# Patient Record
Sex: FEMALE | Race: AMERICAN INDIAN OR ALASKA NATIVE
[De-identification: names, ages, dates, MRNs, and addresses within clinical notes are randomized per-mention and may not be internally consistent; named-entity substitution may affect disease eponyms.]

---

## 2020-09-17 ENCOUNTER — HOSPITAL ENCOUNTER (OUTPATIENT)
Dept: HOSPITAL 5 - TRG | Age: 20
Discharge: LEFT BEFORE DISCHARGE | End: 2020-09-17
Attending: OBSTETRICS & GYNECOLOGY
Payer: COMMERCIAL

## 2020-09-17 VITALS — SYSTOLIC BLOOD PRESSURE: 118 MMHG | DIASTOLIC BLOOD PRESSURE: 90 MMHG

## 2020-09-17 DIAGNOSIS — O36.8130: Primary | ICD-10-CM

## 2020-09-17 DIAGNOSIS — Z3A.39: ICD-10-CM

## 2020-09-17 PROCEDURE — 59025 FETAL NON-STRESS TEST: CPT

## 2022-02-15 ENCOUNTER — HOSPITAL ENCOUNTER (INPATIENT)
Age: 22
LOS: 5 days | Discharge: PSYCHIATRIC OR SUBSTANCE USE FACILITY | DRG: 817 | End: 2022-02-20
Attending: EMERGENCY MEDICINE | Admitting: INTERNAL MEDICINE
Payer: MEDICAID

## 2022-02-15 ENCOUNTER — APPOINTMENT (OUTPATIENT)
Dept: CT IMAGING | Age: 22
DRG: 817 | End: 2022-02-15
Payer: MEDICAID

## 2022-02-15 ENCOUNTER — APPOINTMENT (OUTPATIENT)
Dept: GENERAL RADIOLOGY | Age: 22
DRG: 817 | End: 2022-02-15
Payer: MEDICAID

## 2022-02-15 DIAGNOSIS — I46.9 CARDIAC ARREST (HCC): Primary | ICD-10-CM

## 2022-02-15 LAB
-: ABNORMAL
ABSOLUTE EOS #: 0.03 K/UL (ref 0–0.44)
ABSOLUTE EOS #: 0.05 K/UL (ref 0–0.44)
ABSOLUTE IMMATURE GRANULOCYTE: 0.09 K/UL (ref 0–0.3)
ABSOLUTE IMMATURE GRANULOCYTE: 0.39 K/UL (ref 0–0.3)
ABSOLUTE LYMPH #: 1.08 K/UL (ref 1.1–3.7)
ABSOLUTE LYMPH #: 1.74 K/UL (ref 1.1–3.7)
ABSOLUTE MONO #: 0.89 K/UL (ref 0.1–1.2)
ABSOLUTE MONO #: 1.11 K/UL (ref 0.1–1.2)
ACETAMINOPHEN LEVEL: <5 UG/ML (ref 10–30)
ALBUMIN SERPL-MCNC: 4 G/DL (ref 3.5–5.2)
ALBUMIN/GLOBULIN RATIO: 1.5 (ref 1–2.5)
ALLEN TEST: ABNORMAL
ALP BLD-CCNC: 78 U/L (ref 35–104)
ALT SERPL-CCNC: 300 U/L (ref 5–33)
AMORPHOUS: ABNORMAL
AMPHETAMINE SCREEN URINE: NEGATIVE
ANION GAP SERPL CALCULATED.3IONS-SCNC: 11 MMOL/L (ref 9–17)
ANION GAP SERPL CALCULATED.3IONS-SCNC: 14 MMOL/L (ref 9–17)
ANION GAP SERPL CALCULATED.3IONS-SCNC: 16 MMOL/L (ref 9–17)
AST SERPL-CCNC: 297 U/L
BACTERIA: ABNORMAL
BARBITURATE SCREEN URINE: NEGATIVE
BASOPHILS # BLD: 0 % (ref 0–2)
BASOPHILS # BLD: 0 % (ref 0–2)
BASOPHILS ABSOLUTE: 0.04 K/UL (ref 0–0.2)
BASOPHILS ABSOLUTE: 0.05 K/UL (ref 0–0.2)
BENZODIAZEPINE SCREEN, URINE: NEGATIVE
BILIRUB SERPL-MCNC: <0.1 MG/DL (ref 0.3–1.2)
BILIRUBIN URINE: NEGATIVE
BNP INTERPRETATION: NORMAL
BUN BLDV-MCNC: 13 MG/DL (ref 6–20)
BUN BLDV-MCNC: 16 MG/DL (ref 6–20)
BUN BLDV-MCNC: 18 MG/DL (ref 6–20)
BUN/CREAT BLD: ABNORMAL (ref 9–20)
BUPRENORPHINE URINE: NORMAL
CALCIUM IONIZED: 1.06 MMOL/L (ref 1.13–1.33)
CALCIUM IONIZED: 1.12 MMOL/L (ref 1.13–1.33)
CALCIUM SERPL-MCNC: 7.6 MG/DL (ref 8.6–10.4)
CALCIUM SERPL-MCNC: 8.3 MG/DL (ref 8.6–10.4)
CALCIUM SERPL-MCNC: 8.4 MG/DL (ref 8.6–10.4)
CANNABINOID SCREEN URINE: NEGATIVE
CASTS UA: ABNORMAL /LPF (ref 0–2)
CHLORIDE BLD-SCNC: 108 MMOL/L (ref 98–107)
CHLORIDE BLD-SCNC: 109 MMOL/L (ref 98–107)
CHLORIDE BLD-SCNC: 111 MMOL/L (ref 98–107)
CO2: 15 MMOL/L (ref 20–31)
CO2: 18 MMOL/L (ref 20–31)
CO2: 20 MMOL/L (ref 20–31)
COCAINE METABOLITE, URINE: NEGATIVE
COLOR: YELLOW
COMMENT UA: ABNORMAL
CREAT SERPL-MCNC: 1.06 MG/DL (ref 0.5–0.9)
CREAT SERPL-MCNC: 1.18 MG/DL (ref 0.5–0.9)
CREAT SERPL-MCNC: 1.31 MG/DL (ref 0.5–0.9)
CRYSTALS, UA: ABNORMAL /HPF
DIFFERENTIAL TYPE: ABNORMAL
DIFFERENTIAL TYPE: ABNORMAL
EOSINOPHILS RELATIVE PERCENT: 0 % (ref 1–4)
EOSINOPHILS RELATIVE PERCENT: 0 % (ref 1–4)
EPITHELIAL CELLS UA: ABNORMAL /HPF (ref 0–5)
ETHANOL PERCENT: <0.01 %
ETHANOL: <10 MG/DL
FIO2: 35
FIO2: 35
FIO2: 50
GFR AFRICAN AMERICAN: >60 ML/MIN
GFR NON-AFRICAN AMERICAN: 50 ML/MIN
GFR NON-AFRICAN AMERICAN: 57 ML/MIN
GFR NON-AFRICAN AMERICAN: >60 ML/MIN
GFR SERPL CREATININE-BSD FRML MDRD: ABNORMAL ML/MIN/{1.73_M2}
GLUCOSE BLD-MCNC: 110 MG/DL (ref 65–105)
GLUCOSE BLD-MCNC: 110 MG/DL (ref 70–99)
GLUCOSE BLD-MCNC: 137 MG/DL (ref 65–105)
GLUCOSE BLD-MCNC: 150 MG/DL (ref 65–105)
GLUCOSE BLD-MCNC: 45 MG/DL (ref 70–99)
GLUCOSE BLD-MCNC: 66 MG/DL (ref 74–100)
GLUCOSE BLD-MCNC: 72 MG/DL (ref 74–100)
GLUCOSE BLD-MCNC: 77 MG/DL (ref 70–99)
GLUCOSE BLD-MCNC: 79 MG/DL (ref 65–105)
GLUCOSE BLD-MCNC: 83 MG/DL (ref 74–100)
GLUCOSE BLD-MCNC: 87 MG/DL (ref 65–105)
GLUCOSE URINE: NEGATIVE
HCG QUALITATIVE: NEGATIVE
HCT VFR BLD CALC: 38.1 % (ref 36.3–47.1)
HCT VFR BLD CALC: 40.8 % (ref 36.3–47.1)
HEMOGLOBIN: 12.4 G/DL (ref 11.9–15.1)
HEMOGLOBIN: 12.9 G/DL (ref 11.9–15.1)
IMMATURE GRANULOCYTES: 0 %
IMMATURE GRANULOCYTES: 2 %
INR BLD: 1
INR BLD: 1.1
KETONES, URINE: ABNORMAL
LACTIC ACID, WHOLE BLOOD: 0.6 MMOL/L (ref 0.7–2.1)
LACTIC ACID, WHOLE BLOOD: 1 MMOL/L (ref 0.7–2.1)
LACTIC ACID: NORMAL MMOL/L
LEUKOCYTE ESTERASE, URINE: ABNORMAL
LYMPHOCYTES # BLD: 5 % (ref 24–43)
LYMPHOCYTES # BLD: 9 % (ref 24–43)
MAGNESIUM: 2.3 MG/DL (ref 1.6–2.6)
MAGNESIUM: 2.6 MG/DL (ref 1.6–2.6)
MCH RBC QN AUTO: 30 PG (ref 25.2–33.5)
MCH RBC QN AUTO: 30.5 PG (ref 25.2–33.5)
MCHC RBC AUTO-ENTMCNC: 31.6 G/DL (ref 28.4–34.8)
MCHC RBC AUTO-ENTMCNC: 32.5 G/DL (ref 28.4–34.8)
MCV RBC AUTO: 93.6 FL (ref 82.6–102.9)
MCV RBC AUTO: 94.9 FL (ref 82.6–102.9)
MDMA URINE: NORMAL
METHADONE SCREEN, URINE: NEGATIVE
METHAMPHETAMINE, URINE: NORMAL
MODE: ABNORMAL
MONOCYTES # BLD: 4 % (ref 3–12)
MONOCYTES # BLD: 6 % (ref 3–12)
MUCUS: ABNORMAL
NEGATIVE BASE EXCESS, ART: 3 (ref 0–2)
NEGATIVE BASE EXCESS, ART: 4 (ref 0–2)
NEGATIVE BASE EXCESS, ART: 4 (ref 0–2)
NITRITE, URINE: NEGATIVE
NRBC AUTOMATED: 0 PER 100 WBC
NRBC AUTOMATED: 0 PER 100 WBC
O2 DEVICE/FLOW/%: ABNORMAL
OPIATES, URINE: NEGATIVE
OTHER OBSERVATIONS UA: ABNORMAL
OXYCODONE SCREEN URINE: NEGATIVE
PARTIAL THROMBOPLASTIN TIME: 21.6 SEC (ref 20.5–30.5)
PARTIAL THROMBOPLASTIN TIME: 35 SEC (ref 20.5–30.5)
PATIENT TEMP: 36.9
PATIENT TEMP: 37.7
PATIENT TEMP: ABNORMAL
PDW BLD-RTO: 13.4 % (ref 11.8–14.4)
PDW BLD-RTO: 13.5 % (ref 11.8–14.4)
PH UA: 6.5 (ref 5–8)
PHENCYCLIDINE, URINE: NEGATIVE
PHOSPHORUS: 4.1 MG/DL (ref 2.6–4.5)
PHOSPHORUS: 4.9 MG/DL (ref 2.6–4.5)
PLATELET # BLD: 276 K/UL (ref 138–453)
PLATELET # BLD: 343 K/UL (ref 138–453)
PLATELET ESTIMATE: ABNORMAL
PLATELET ESTIMATE: ABNORMAL
PMV BLD AUTO: 8.9 FL (ref 8.1–13.5)
PMV BLD AUTO: 9.4 FL (ref 8.1–13.5)
POC HCO3: 21.1 MMOL/L (ref 21–28)
POC HCO3: 21.6 MMOL/L (ref 21–28)
POC HCO3: 22.2 MMOL/L (ref 21–28)
POC O2 SATURATION: 98 % (ref 94–98)
POC O2 SATURATION: 99 % (ref 94–98)
POC O2 SATURATION: 99 % (ref 94–98)
POC PCO2 TEMP: 40 MM HG
POC PCO2 TEMP: ABNORMAL MM HG
POC PCO2 TEMP: ABNORMAL MM HG
POC PCO2: 39.2 MM HG (ref 35–48)
POC PCO2: 39.6 MM HG (ref 35–48)
POC PCO2: 41 MM HG (ref 35–48)
POC PH TEMP: 7.35
POC PH TEMP: ABNORMAL
POC PH TEMP: ABNORMAL
POC PH: 7.33 (ref 7.35–7.45)
POC PH: 7.33 (ref 7.35–7.45)
POC PH: 7.36 (ref 7.35–7.45)
POC PO2 TEMP: 135 MM HG
POC PO2 TEMP: ABNORMAL MM HG
POC PO2 TEMP: ABNORMAL MM HG
POC PO2: 122.3 MM HG (ref 83–108)
POC PO2: 130 MM HG (ref 83–108)
POC PO2: 164.6 MM HG (ref 83–108)
POSITIVE BASE EXCESS, ART: ABNORMAL (ref 0–3)
POTASSIUM SERPL-SCNC: 3.6 MMOL/L (ref 3.7–5.3)
POTASSIUM SERPL-SCNC: 3.6 MMOL/L (ref 3.7–5.3)
POTASSIUM SERPL-SCNC: 4 MMOL/L (ref 3.7–5.3)
PRO-BNP: 179 PG/ML
PROPOXYPHENE, URINE: NORMAL
PROTEIN UA: ABNORMAL
PROTHROMBIN TIME: 10.8 SEC (ref 9.1–12.3)
PROTHROMBIN TIME: 11.8 SEC (ref 9.1–12.3)
RBC # BLD: 4.07 M/UL (ref 3.95–5.11)
RBC # BLD: 4.3 M/UL (ref 3.95–5.11)
RBC # BLD: ABNORMAL 10*6/UL
RBC # BLD: ABNORMAL 10*6/UL
RBC UA: ABNORMAL /HPF (ref 0–2)
RENAL EPITHELIAL, UA: ABNORMAL /HPF
SALICYLATE LEVEL: <1 MG/DL (ref 3–10)
SAMPLE SITE: ABNORMAL
SARS-COV-2, RAPID: NOT DETECTED
SEG NEUTROPHILS: 83 % (ref 36–65)
SEG NEUTROPHILS: 91 % (ref 36–65)
SEGMENTED NEUTROPHILS ABSOLUTE COUNT: 16.23 K/UL (ref 1.5–8.1)
SEGMENTED NEUTROPHILS ABSOLUTE COUNT: 18.88 K/UL (ref 1.5–8.1)
SODIUM BLD-SCNC: 137 MMOL/L (ref 135–144)
SODIUM BLD-SCNC: 142 MMOL/L (ref 135–144)
SODIUM BLD-SCNC: 143 MMOL/L (ref 135–144)
SPECIFIC GRAVITY UA: 1.02 (ref 1–1.03)
SPECIMEN DESCRIPTION: NORMAL
TCO2 (CALC), ART: ABNORMAL MMOL/L (ref 22–29)
TEST INFORMATION: NORMAL
TOTAL PROTEIN: 6.7 G/DL (ref 6.4–8.3)
TOXIC TRICYCLIC SC,BLOOD: NEGATIVE
TRICHOMONAS: ABNORMAL
TRICYCLIC ANTIDEPRESSANTS, UR: NORMAL
TROPONIN INTERP: ABNORMAL
TROPONIN INTERP: ABNORMAL
TROPONIN T: ABNORMAL NG/ML
TROPONIN T: ABNORMAL NG/ML
TROPONIN, HIGH SENSITIVITY: 116 NG/L (ref 0–14)
TROPONIN, HIGH SENSITIVITY: 98 NG/L (ref 0–14)
TURBIDITY: ABNORMAL
URINE HGB: ABNORMAL
UROBILINOGEN, URINE: NORMAL
WBC # BLD: 19.6 K/UL (ref 3.5–11.3)
WBC # BLD: 21 K/UL (ref 3.5–11.3)
WBC # BLD: ABNORMAL 10*3/UL
WBC # BLD: ABNORMAL 10*3/UL
WBC UA: ABNORMAL /HPF (ref 0–5)
YEAST: ABNORMAL

## 2022-02-15 PROCEDURE — 85730 THROMBOPLASTIN TIME PARTIAL: CPT

## 2022-02-15 PROCEDURE — 94002 VENT MGMT INPAT INIT DAY: CPT

## 2022-02-15 PROCEDURE — 99291 CRITICAL CARE FIRST HOUR: CPT | Performed by: INTERNAL MEDICINE

## 2022-02-15 PROCEDURE — 74177 CT ABD & PELVIS W/CONTRAST: CPT

## 2022-02-15 PROCEDURE — 83880 ASSAY OF NATRIURETIC PEPTIDE: CPT

## 2022-02-15 PROCEDURE — 2580000003 HC RX 258: Performed by: STUDENT IN AN ORGANIZED HEALTH CARE EDUCATION/TRAINING PROGRAM

## 2022-02-15 PROCEDURE — 6370000000 HC RX 637 (ALT 250 FOR IP): Performed by: GENERAL PRACTICE

## 2022-02-15 PROCEDURE — 5A1945Z RESPIRATORY VENTILATION, 24-96 CONSECUTIVE HOURS: ICD-10-PCS | Performed by: INTERNAL MEDICINE

## 2022-02-15 PROCEDURE — 71260 CT THORAX DX C+: CPT

## 2022-02-15 PROCEDURE — 84100 ASSAY OF PHOSPHORUS: CPT

## 2022-02-15 PROCEDURE — 80053 COMPREHEN METABOLIC PANEL: CPT

## 2022-02-15 PROCEDURE — 02H633Z INSERTION OF INFUSION DEVICE INTO RIGHT ATRIUM, PERCUTANEOUS APPROACH: ICD-10-PCS | Performed by: INTERNAL MEDICINE

## 2022-02-15 PROCEDURE — 6360000002 HC RX W HCPCS: Performed by: GENERAL PRACTICE

## 2022-02-15 PROCEDURE — 93005 ELECTROCARDIOGRAM TRACING: CPT | Performed by: STUDENT IN AN ORGANIZED HEALTH CARE EDUCATION/TRAINING PROGRAM

## 2022-02-15 PROCEDURE — 70450 CT HEAD/BRAIN W/O DYE: CPT

## 2022-02-15 PROCEDURE — 6360000002 HC RX W HCPCS

## 2022-02-15 PROCEDURE — 83735 ASSAY OF MAGNESIUM: CPT

## 2022-02-15 PROCEDURE — 36556 INSERT NON-TUNNEL CV CATH: CPT | Performed by: INTERNAL MEDICINE

## 2022-02-15 PROCEDURE — 6360000002 HC RX W HCPCS: Performed by: STUDENT IN AN ORGANIZED HEALTH CARE EDUCATION/TRAINING PROGRAM

## 2022-02-15 PROCEDURE — 82803 BLOOD GASES ANY COMBINATION: CPT

## 2022-02-15 PROCEDURE — 81001 URINALYSIS AUTO W/SCOPE: CPT

## 2022-02-15 PROCEDURE — 36620 INSERTION CATHETER ARTERY: CPT | Performed by: INTERNAL MEDICINE

## 2022-02-15 PROCEDURE — 80048 BASIC METABOLIC PNL TOTAL CA: CPT

## 2022-02-15 PROCEDURE — 6370000000 HC RX 637 (ALT 250 FOR IP): Performed by: STUDENT IN AN ORGANIZED HEALTH CARE EDUCATION/TRAINING PROGRAM

## 2022-02-15 PROCEDURE — 37799 UNLISTED PX VASCULAR SURGERY: CPT

## 2022-02-15 PROCEDURE — G0480 DRUG TEST DEF 1-7 CLASSES: HCPCS

## 2022-02-15 PROCEDURE — 36620 INSERTION CATHETER ARTERY: CPT

## 2022-02-15 PROCEDURE — 82947 ASSAY GLUCOSE BLOOD QUANT: CPT

## 2022-02-15 PROCEDURE — 80143 DRUG ASSAY ACETAMINOPHEN: CPT

## 2022-02-15 PROCEDURE — 83605 ASSAY OF LACTIC ACID: CPT

## 2022-02-15 PROCEDURE — 71045 X-RAY EXAM CHEST 1 VIEW: CPT

## 2022-02-15 PROCEDURE — 84484 ASSAY OF TROPONIN QUANT: CPT

## 2022-02-15 PROCEDURE — 2500000003 HC RX 250 WO HCPCS: Performed by: STUDENT IN AN ORGANIZED HEALTH CARE EDUCATION/TRAINING PROGRAM

## 2022-02-15 PROCEDURE — 85025 COMPLETE CBC W/AUTO DIFF WBC: CPT

## 2022-02-15 PROCEDURE — 2580000003 HC RX 258: Performed by: GENERAL PRACTICE

## 2022-02-15 PROCEDURE — 36556 INSERT NON-TUNNEL CV CATH: CPT

## 2022-02-15 PROCEDURE — 2000000000 HC ICU R&B

## 2022-02-15 PROCEDURE — 2700000000 HC OXYGEN THERAPY PER DAY

## 2022-02-15 PROCEDURE — 99284 EMERGENCY DEPT VISIT MOD MDM: CPT

## 2022-02-15 PROCEDURE — 80307 DRUG TEST PRSMV CHEM ANLYZR: CPT

## 2022-02-15 PROCEDURE — 87635 SARS-COV-2 COVID-19 AMP PRB: CPT

## 2022-02-15 PROCEDURE — 2500000003 HC RX 250 WO HCPCS: Performed by: GENERAL PRACTICE

## 2022-02-15 PROCEDURE — 80179 DRUG ASSAY SALICYLATE: CPT

## 2022-02-15 PROCEDURE — 94761 N-INVAS EAR/PLS OXIMETRY MLT: CPT

## 2022-02-15 PROCEDURE — 84703 CHORIONIC GONADOTROPIN ASSAY: CPT

## 2022-02-15 PROCEDURE — 6360000004 HC RX CONTRAST MEDICATION: Performed by: STUDENT IN AN ORGANIZED HEALTH CARE EDUCATION/TRAINING PROGRAM

## 2022-02-15 PROCEDURE — 85610 PROTHROMBIN TIME: CPT

## 2022-02-15 PROCEDURE — 94003 VENT MGMT INPAT SUBQ DAY: CPT

## 2022-02-15 PROCEDURE — 82330 ASSAY OF CALCIUM: CPT

## 2022-02-15 RX ORDER — POLYETHYLENE GLYCOL 3350 17 G/17G
17 POWDER, FOR SOLUTION ORAL DAILY PRN
Status: DISCONTINUED | OUTPATIENT
Start: 2022-02-15 | End: 2022-02-20 | Stop reason: HOSPADM

## 2022-02-15 RX ORDER — DEXTROSE MONOHYDRATE 25 G/50ML
12.5 INJECTION, SOLUTION INTRAVENOUS PRN
Status: DISCONTINUED | OUTPATIENT
Start: 2022-02-15 | End: 2022-02-20 | Stop reason: HOSPADM

## 2022-02-15 RX ORDER — MAGNESIUM SULFATE IN WATER 40 MG/ML
2000 INJECTION, SOLUTION INTRAVENOUS ONCE
Status: COMPLETED | OUTPATIENT
Start: 2022-02-15 | End: 2022-02-15

## 2022-02-15 RX ORDER — ONDANSETRON 4 MG/1
4 TABLET, ORALLY DISINTEGRATING ORAL EVERY 8 HOURS PRN
Status: DISCONTINUED | OUTPATIENT
Start: 2022-02-15 | End: 2022-02-20 | Stop reason: HOSPADM

## 2022-02-15 RX ORDER — HEPARIN SODIUM 1000 [USP'U]/ML
4000 INJECTION, SOLUTION INTRAVENOUS; SUBCUTANEOUS ONCE
Status: COMPLETED | OUTPATIENT
Start: 2022-02-15 | End: 2022-02-15

## 2022-02-15 RX ORDER — HEPARIN SODIUM 1000 [USP'U]/ML
4000 INJECTION, SOLUTION INTRAVENOUS; SUBCUTANEOUS PRN
Status: DISCONTINUED | OUTPATIENT
Start: 2022-02-15 | End: 2022-02-17

## 2022-02-15 RX ORDER — ONDANSETRON 2 MG/ML
4 INJECTION INTRAMUSCULAR; INTRAVENOUS EVERY 6 HOURS PRN
Status: DISCONTINUED | OUTPATIENT
Start: 2022-02-15 | End: 2022-02-20 | Stop reason: HOSPADM

## 2022-02-15 RX ORDER — DEXTROSE MONOHYDRATE 50 MG/ML
100 INJECTION, SOLUTION INTRAVENOUS PRN
Status: DISCONTINUED | OUTPATIENT
Start: 2022-02-15 | End: 2022-02-20 | Stop reason: HOSPADM

## 2022-02-15 RX ORDER — CALCIUM GLUCONATE 20 MG/ML
INJECTION, SOLUTION INTRAVENOUS
Status: DISCONTINUED
Start: 2022-02-15 | End: 2022-02-15 | Stop reason: WASHOUT

## 2022-02-15 RX ORDER — SODIUM CHLORIDE 0.9 % (FLUSH) 0.9 %
5-40 SYRINGE (ML) INJECTION PRN
Status: DISCONTINUED | OUTPATIENT
Start: 2022-02-15 | End: 2022-02-20 | Stop reason: HOSPADM

## 2022-02-15 RX ORDER — ONDANSETRON 4 MG/1
4 TABLET, ORALLY DISINTEGRATING ORAL EVERY 8 HOURS PRN
Status: DISCONTINUED | OUTPATIENT
Start: 2022-02-15 | End: 2022-02-15 | Stop reason: SDUPTHER

## 2022-02-15 RX ORDER — ACETAMINOPHEN 325 MG/1
650 TABLET ORAL EVERY 6 HOURS PRN
Status: DISCONTINUED | OUTPATIENT
Start: 2022-02-15 | End: 2022-02-20 | Stop reason: HOSPADM

## 2022-02-15 RX ORDER — ONDANSETRON 2 MG/ML
4 INJECTION INTRAMUSCULAR; INTRAVENOUS EVERY 6 HOURS PRN
Status: DISCONTINUED | OUTPATIENT
Start: 2022-02-15 | End: 2022-02-15 | Stop reason: SDUPTHER

## 2022-02-15 RX ORDER — NOREPINEPHRINE BIT/0.9 % NACL 16MG/250ML
INFUSION BOTTLE (ML) INTRAVENOUS
Status: DISPENSED
Start: 2022-02-15 | End: 2022-02-15

## 2022-02-15 RX ORDER — SODIUM CHLORIDE 9 MG/ML
INJECTION, SOLUTION INTRAVENOUS CONTINUOUS
Status: DISCONTINUED | OUTPATIENT
Start: 2022-02-15 | End: 2022-02-18

## 2022-02-15 RX ORDER — CHLORHEXIDINE GLUCONATE 0.12 MG/ML
15 RINSE ORAL 2 TIMES DAILY
Status: DISCONTINUED | OUTPATIENT
Start: 2022-02-15 | End: 2022-02-20 | Stop reason: HOSPADM

## 2022-02-15 RX ORDER — SODIUM CHLORIDE 9 MG/ML
25 INJECTION, SOLUTION INTRAVENOUS PRN
Status: DISCONTINUED | OUTPATIENT
Start: 2022-02-15 | End: 2022-02-20 | Stop reason: HOSPADM

## 2022-02-15 RX ORDER — SODIUM CHLORIDE 0.9 % (FLUSH) 0.9 %
5-40 SYRINGE (ML) INJECTION EVERY 12 HOURS SCHEDULED
Status: DISCONTINUED | OUTPATIENT
Start: 2022-02-15 | End: 2022-02-20 | Stop reason: HOSPADM

## 2022-02-15 RX ORDER — HEPARIN SODIUM 1000 [USP'U]/ML
2000 INJECTION, SOLUTION INTRAVENOUS; SUBCUTANEOUS PRN
Status: DISCONTINUED | OUTPATIENT
Start: 2022-02-15 | End: 2022-02-17

## 2022-02-15 RX ORDER — ASPIRIN 81 MG/1
81 TABLET, CHEWABLE ORAL DAILY
Status: DISCONTINUED | OUTPATIENT
Start: 2022-02-15 | End: 2022-02-20 | Stop reason: HOSPADM

## 2022-02-15 RX ORDER — ACETAMINOPHEN 650 MG/1
650 SUPPOSITORY RECTAL EVERY 6 HOURS PRN
Status: DISCONTINUED | OUTPATIENT
Start: 2022-02-15 | End: 2022-02-20 | Stop reason: HOSPADM

## 2022-02-15 RX ORDER — NICOTINE POLACRILEX 4 MG
15 LOZENGE BUCCAL PRN
Status: DISCONTINUED | OUTPATIENT
Start: 2022-02-15 | End: 2022-02-20 | Stop reason: HOSPADM

## 2022-02-15 RX ADMIN — Medication 1 MG/HR: at 03:12

## 2022-02-15 RX ADMIN — FAMOTIDINE 20 MG: 10 INJECTION INTRAVENOUS at 20:08

## 2022-02-15 RX ADMIN — SODIUM CHLORIDE, PRESERVATIVE FREE 10 ML: 5 INJECTION INTRAVENOUS at 20:08

## 2022-02-15 RX ADMIN — SODIUM CHLORIDE: 9 INJECTION, SOLUTION INTRAVENOUS at 16:27

## 2022-02-15 RX ADMIN — CHLORHEXIDINE GLUCONATE 15 ML: 1.2 RINSE ORAL at 10:02

## 2022-02-15 RX ADMIN — ASPIRIN 81 MG: 81 TABLET, CHEWABLE ORAL at 10:41

## 2022-02-15 RX ADMIN — DEXTROSE MONOHYDRATE 12.5 G: 25 INJECTION, SOLUTION INTRAVENOUS at 19:55

## 2022-02-15 RX ADMIN — HEPARIN SODIUM 12 UNITS/KG/HR: 5000 INJECTION INTRAVENOUS; SUBCUTANEOUS at 09:21

## 2022-02-15 RX ADMIN — Medication 9 MG/HR: at 12:24

## 2022-02-15 RX ADMIN — SODIUM CHLORIDE 100 ML/HR: 9 INJECTION, SOLUTION INTRAVENOUS at 06:59

## 2022-02-15 RX ADMIN — Medication 50 MCG/HR: at 03:33

## 2022-02-15 RX ADMIN — Medication 125 MCG/HR: at 16:29

## 2022-02-15 RX ADMIN — CHLORHEXIDINE GLUCONATE 15 ML: 1.2 RINSE ORAL at 20:08

## 2022-02-15 RX ADMIN — MAGNESIUM SULFATE 2000 MG: 2 INJECTION INTRAVENOUS at 03:52

## 2022-02-15 RX ADMIN — HEPARIN SODIUM 2000 UNITS: 1000 INJECTION INTRAVENOUS; SUBCUTANEOUS at 16:28

## 2022-02-15 RX ADMIN — DEXTROSE MONOHYDRATE 250 ML: 100 INJECTION, SOLUTION INTRAVENOUS at 03:21

## 2022-02-15 RX ADMIN — Medication 10 MG/HR: at 22:41

## 2022-02-15 RX ADMIN — FAMOTIDINE 20 MG: 10 INJECTION INTRAVENOUS at 10:02

## 2022-02-15 RX ADMIN — Medication 125 MCG/HR: at 09:44

## 2022-02-15 RX ADMIN — HEPARIN SODIUM 4000 UNITS: 1000 INJECTION INTRAVENOUS; SUBCUTANEOUS at 09:43

## 2022-02-15 RX ADMIN — CALCIUM GLUCONATE 2000 MG: 98 INJECTION, SOLUTION INTRAVENOUS at 19:10

## 2022-02-15 RX ADMIN — IOPAMIDOL 75 ML: 755 INJECTION, SOLUTION INTRAVENOUS at 04:33

## 2022-02-15 RX ADMIN — SODIUM CHLORIDE, PRESERVATIVE FREE 10 ML: 5 INJECTION INTRAVENOUS at 10:07

## 2022-02-15 ASSESSMENT — PULMONARY FUNCTION TESTS
PIF_VALUE: 20
PIF_VALUE: 19
PIF_VALUE: 12
PIF_VALUE: 16
PIF_VALUE: 18
PIF_VALUE: 15
PIF_VALUE: 19
PIF_VALUE: 13
PIF_VALUE: 18
PIF_VALUE: 15
PIF_VALUE: 16
PIF_VALUE: 18
PIF_VALUE: 10
PIF_VALUE: 11
PIF_VALUE: 17
PIF_VALUE: 11
PIF_VALUE: 18
PIF_VALUE: 24
PIF_VALUE: 17
PIF_VALUE: 27
PIF_VALUE: 25
PIF_VALUE: 18
PIF_VALUE: 12
PIF_VALUE: 17

## 2022-02-15 NOTE — PROGRESS NOTES
The transport originated from ED room 12. Pt. was transported to CT and back to ED room 12. Assisting with the transport was ALEXIS De León. Appropriate devices were applied to monitor the patient's condition during transport. Patient transported  via 100% O2 via ventilator. Patient tolerated well.         Ileana Sanchez RCP  4:45 AM

## 2022-02-15 NOTE — FLOWSHEET NOTE
SPIRITUAL CARE DEPARTMENT - [facility] [address]  PROGRESS NOTE    Shift date: 2/14/2022  Shift day: Monday   Shift # 3    Room # ED12  Name: Geo Squires            Age: 19 y.o. Gender: female          Mu-ism: Unknown  Place of Jehovah's witness: Unknown    Referral: ED Alert/Post-Arrest    Admit Date & Time: 2/15/2022  2:56 AM    PATIENT/EVENT DESCRIPTION:  Geo Squires is a 19 y.o. female who arrived to ED12 due to \"Post-Arrest.\" Per report, patient \"was at correctional facility being booked when she ingested something, causing her to seize and arrest.\" Patient was \"intubated,\" at time of 's arrival to room. SPIRITUAL ASSESSMENT/INTERVENTION:   became aware of patient while rounding in the ED.  gathered patient information in room and spoke with guards.  learned from ED Triage that patient's father had arrived to the waiting room. Per officer, patient Rodrigo Hanks be released from custody after a hearing this morning. \" Until then, patient \"remains in custody and cannot receive any visitors. \"  informed patient's father of circumstances. Per father, he will return in the morning.  gathered patient's father's and mother's name and information for patient's chart to ensure they are updated on condition when possible. SPIRITUAL CARE FOLLOW-UP PLAN:  Chaplains will remain available to offer spiritual and emotional support as needed. 02/15/22 0550   Encounter Summary   Services provided to: Patient not available;Family   Referral/Consult From: Multi-disciplinary team  (ED Alert)   Support System Parent   Continue Visiting   (2/14/2022)   Complexity of Encounter High   Length of Encounter 45 minutes   Spiritual Assessment Completed Yes   Routine   Type Initial   Crisis   Type ED Alert   Spiritual/Christian   Type Spiritual support   Assessment Anxious; Fearful   Intervention Sustaining presence/ Ministry of presence   Outcome Receptive       Electronically signed by jorge Saucedo 2/15/2022 at 6:27 AM.  [address] [facility]  [phone]

## 2022-02-15 NOTE — PROCEDURES
Arterial Line Placement Procedure Note          Performed by: Anamika Medina DO               Indication: arterial blood gases    Consent: Unable to be obtained due to the emergent nature of this procedure. Zaki's Test: Not indicated in this particular procedure    Time out performed: Immediately prior to the procedure a \"time out\" was called to verify the correct patient, the correct procedure, equipment, support staff and site/side marked as required. All elements of maximal sterile barrier techniques were followed. Procedure: The skin over the left femoral artery was prepped with Chloraprep. Local anesthesia was not performed. A arterial line catheter was then inserted, using a modified Seldinger technique, into the vessel. The transducer set was then attached and a waveform could not be obtained. After troubleshooting the line, still no waveform was obtained. The line was removed. The patient had good distal perfusion after the procedure. The patient tolerated the procedure well.      Complications: None

## 2022-02-15 NOTE — PROGRESS NOTES
Comprehensive Nutrition Assessment    Type and Reason for Visit:  Initial (Vent)    Nutrition Recommendations/Plan: Start nutrition as able; if TF needed, suggest Standard without Fiber goal 60 mL/hr to provide 1728 kcal and 80 g pro/day. Will follow/monitor care plans. Nutrition Assessment:  Pt admitted s/p cardiac arrest secondary to cocaine intoxication. Pt is currently intubated and on hypothermia protocol. Meds/labs reviewed. Malnutrition Assessment:  Malnutrition Status: At risk for malnutrition    Context:  Acute Illness     Findings of the 6 clinical characteristics of malnutrition:  Energy Intake:  Mild decrease in energy intake  Weight Loss:  Unable to assess     Body Fat Loss:  No significant body fat loss     Muscle Mass Loss:  No significant muscle mass loss    Fluid Accumulation:  1 - Mild Extremities,Generalized   Strength:  Not Performed    Estimated Daily Nutrient Needs:  Energy (kcal):  3556-7534 kcal/day; Weight Used for Energy Requirements:  Ideal     Protein (g):   g pro/day; Weight Used for Protein Requirements:  Ideal (1.2-1.5)        Fluid (ml/day):  2100 mL/day; Method Used for Fluid Requirements:  ml/Kg (30)      Nutrition Related Findings:  meds/labs reviewed      Wounds:  None       Current Nutrition Therapies:    Diet NPO  Additional Calorie Sources:   none    Anthropometric Measures:  · Height: 5' 11\" (180.3 cm)  · Current Body Weight: 180 lb (81.6 kg)   · Admission Body Weight: 180 lb (81.6 kg) (Stated)    · Ideal Body Weight: 155 lbs; % Ideal Body Weight 116.1 %   · BMI: 25.1  · BMI Categories: Overweight (BMI 25.0-29. 9)       Nutrition Diagnosis:   · Inadequate oral intake related to impaired respiratory function as evidenced by NPO or clear liquid status due to medical condition    Nutrition Interventions:   Food and/or Nutrient Delivery: Start nutrition as able; if TF needed, suggest Standard without Fiber goal 60 mL/hr to provide 1728 kcal and 80 g

## 2022-02-15 NOTE — PLAN OF CARE
Problem: OXYGENATION/RESPIRATORY FUNCTION  Goal: Patient will maintain patent airway  2/15/2022 0805 by Pati Menon RCP  Outcome: Ongoing     Problem: OXYGENATION/RESPIRATORY FUNCTION  Goal: Patient will achieve/maintain normal respiratory rate/effort  Description: Respiratory rate and effort will be within normal limits for the patient  2/15/2022 0805 by Pati Menon RCP  Outcome: Ongoing     Problem: MECHANICAL VENTILATION  Goal: Patient will maintain patent airway  2/15/2022 0805 by Pati Menon RCP  Outcome: Ongoing     Problem: MECHANICAL VENTILATION  Goal: Oral health is maintained or improved  2/15/2022 0805 by Pati Menon RCP  Outcome: Ongoing     Problem: MECHANICAL VENTILATION  Goal: ET tube will be managed safely  2/15/2022 0805 by Pati Menon RCP  Outcome: Ongoing     Problem: MECHANICAL VENTILATION  Goal: Ability to express needs and understand communication  2/15/2022 0805 by Pati Menon RCP  Outcome: Ongoing     Problem: MECHANICAL VENTILATION  Goal: Mobility/activity is maintained at optimum level for patient  2/15/2022 0805 by Pati Menon RCP  Outcome: Ongoing     Problem: SKIN INTEGRITY  Goal: Skin integrity is maintained or improved  2/15/2022 0805 by Pati Menon RCP  Outcome: Ongoing

## 2022-02-15 NOTE — CONSULTS
Attending Physician Statement:    I have discussed the care of  Jovita Caballero , including pertinent history and exam findings, with the Cardiology fellow/resident. I have seen and examined the patient and the key elements of all parts of the encounter have been performed by me. I agree with the assessment, plan and orders as documented by the fellow/resident, after I modified exam findings and plan of treatments, and the final version is my approved version of the assessment. Additional Comments:   PEA / Asystolic arrest- no rhythm strips to evaluate- due to cocaine use  Cocaine +  Resp failure  PRATIMA  Elevated troponin- likely type 2 NSTEMI  - await 2d Echo  - continue heparin drip  - repeat trop  - remainder of care per primary team  - will follow    Roc Ruvalcaba DO, [facility], [address], [address], [place] [address] [facility]  [email or web address]  [phone]     [facility]    Consult / H&P               Today's Date: 2/15/2022  Patient Name: Jovita Caballero  Date of admission: 2/15/2022  2:56 AM  Patient's age: 19 y. o., 2000  Admission Dx: Cardiac arrest ([address] [I46.9]    Reason for Consult:  Cardiac evaluation    Requesting Physician: Elvie Real MD    CHIEF COMPLAINT:  Cardiac arrest    History Obtained From:  electronic medical record, reason patient could not give history:  on ventilator    HISTORY OF PRESENT ILLNESS:      The patient is a 19 y.o.  female who is admitted to the hospital for cardiac arrest.    This is a patient who originally presented to a correctional facility after swallowing crack cocaine and was found to have tonic clonic movements on a security camera footage. Later found to be in asystole cardiac arrest. Initiated CPR with 5 doses of 1 mg epinephrine. Obtained ROSC after 20 minutes. Was brought to the ER by EMS. She was intubated prior arrival.    On my assessment,  Patient is intubated and sedated with Versed at 7 and fentanyl at 125.   On hypothermia protocol. Paralysed with Nimbex. Per RN, patient moved all 4 extremities and intact brainstem reflexes off sedation. On minimal ventilatory support. Hemodynamically stable. Off pressors. Bicarb 15, Cr 1.31, BUN 13. On heparin infusion. Troponins 116  , , Alk phos 78, Bili <0.10.  UA with micro showed evidence of UTI. Past Medical History:   has no past medical history on file. Past Surgical History:   has no past surgical history on file.      Home Medications:    Prior to Admission medications    Not on File      Current Facility-Administered Medications: norepinephrine-sodium chloride (LEVOPHED) 16-0.9 MG/250ML-% infusion, , ,   midazolam (VERSED) 1 mg/mL in D5W infusion, 1-10 mg/hr, IntraVENous, Continuous  fentaNYL 20 mcg/mL Infusion, 12.5-200 mcg/hr, IntraVENous, Continuous  sodium chloride flush 0.9 % injection 5-40 mL, 5-40 mL, IntraVENous, 2 times per day  sodium chloride flush 0.9 % injection 5-40 mL, 5-40 mL, IntraVENous, PRN  0.9 % sodium chloride infusion, 25 mL, IntraVENous, PRN  polyethylene glycol (GLYCOLAX) packet 17 g, 17 g, Oral, Daily PRN  acetaminophen (TYLENOL) tablet 650 mg, 650 mg, Oral, Q6H PRN **OR** acetaminophen (TYLENOL) suppository 650 mg, 650 mg, Rectal, Q6H PRN  heparin (porcine) injection 4,000 Units, 4,000 Units, IntraVENous, Once  heparin (porcine) injection 4,000 Units, 4,000 Units, IntraVENous, PRN  heparin (porcine) injection 2,000 Units, 2,000 Units, IntraVENous, PRN  heparin (porcine) 25,000 Units in sodium chloride 0.9 % 250 mL infusion, 5-30 Units/kg/hr, IntraVENous, Continuous  0.9 % sodium chloride infusion, , IntraVENous, Continuous  famotidine (PEPCID) injection 20 mg, 20 mg, IntraVENous, BID  ondansetron (ZOFRAN-ODT) disintegrating tablet 4 mg, 4 mg, Oral, Q8H PRN **OR** ondansetron (ZOFRAN) injection 4 mg, 4 mg, IntraVENous, Q6H PRN  chlorhexidine (PERIDEX) 0.12 % solution 15 mL, 15 mL, Mouth/Throat, BID  cisatracurium besylate (NIMBEX) 200 mg in sodium chloride 0.9 % 100 mL infusion, 0.5-10 mcg/kg/min, IntraVENous, Continuous    Allergies:  Patient has no allergy information on record. Social History:        Family History: family history is not on file. REVIEW OF SYSTEMS:    Review of systems could not be obtained due to patient gaby intubated an sedated. PHYSICAL EXAM:      /71   Pulse 93   Temp 97.9 °F (36.6 °C) (Oral)   Resp 19   Ht 5' 11\" (1.803 m)   Wt 180 lb (81.6 kg)   SpO2 100%   BMI 25.10 kg/m²      Constitutional and General Appearance: Intubated and sedated  HEENT: PERRL, no cervical lymphadenopathy. No masses palpable. Normal oral mucosa  Respiratory:  Normal excursion and expansion without use of accessory muscles  Resp Auscultation: Good respiratory effort. No for increased work of breathing. On auscultation: clear to auscultation bilaterally  Cardiovascular: The apical impulse is not displaced  Heart tones are crisp and normal. regular S1 and S2.  Jugular venous pulsation Normal  The carotid upstroke is normal in amplitude and contour without delay or bruit  Peripheral pulses are symmetrical and full   Abdomen:   No masses or tenderness  Bowel sounds present  Extremities:   No Cyanosis or Clubbing   Lower extremity edema: No   Skin: Warm and dry  Neurological:  Intubated and sedated    DATA:    Diagnostics:      EKG:   normal sinus rhythm, nonspecific ST and T waves changes, unchanged from previous tracings. ECHO:   not obtained. Stress Test:   not obtained. Cardiac Angiography:   not obtained. Labs:     CBC:   Recent Labs     02/15/22  0342   WBC 19.6*   HGB 12.9   HCT 40.8        BMP:   Recent Labs     02/15/22  0342      K 4.0   CO2 15*   BUN 13   CREATININE 1.31*   LABGLOM 50*   GLUCOSE 45*     BNP: No results for input(s): BNP in the last 72 hours.   PT/INR:   Recent Labs     02/15/22  0342   PROTIME 10.8   INR 1.0     APTT:  Recent Labs     02/15/22  0342   APTT 21.6 CARDIAC ENZYMES:No results for input(s): CKTOTAL, CKMB, CKMBINDEX, TROPONINI in the last 72 hours. FASTING LIPID PANEL:  Lab Results   Component Value Date    HDL 38 10/29/2013    TRIG 28 10/29/2013     LIVER PROFILE:  Recent Labs     02/15/22  0342   *   *   LABALBU 4.0       IMPRESSION:    PEA/asystole cardiac arrest secondary to cocaine intoxication  Acute respiratory failure due to cardiac arrest - on mechanical ventilation  Cocaine intoxication  PRATIMA    Patient Active Problem List   Diagnosis    Cardiac arrest [facility])       RECOMMENDATIONS:  Obtain 2D ECHO  Trend troponins. Hypothermia protocol. Start ASA 81 mg daily. Continue heparin infusion at low dose protocol. Avoid beta blockers. Further recommendations pending echo results and neurological assessment. Rest of the management per primary. Will discuss with rounding attending  for final recommendations.     Omer Avalos MD  Cardiology Service  Internal Medicine Residency Program, PGY-3  [facility]

## 2022-02-15 NOTE — ED NOTES
Bed: 12  Expected date:   Expected time:   Means of arrival:   Comments:  LF2  18 yo F  Post cardiac arrest, > ROSC  Ems report possible crack use > seizure > possibly \"ingested bag\" of drug  Intubated, sbp 94 on levo gtt  Hr 89  Accepted by Dr Suyapa Gutierrez, RN  02/15/22 2319

## 2022-02-15 NOTE — PLAN OF CARE
Nutrition Problem #1: Inadequate oral intake  Intervention: Food and/or Nutrient Delivery:  (Start nutrition as able; if TF needed, suggest Standard without Fiber goal 60 mL/hr to provide 1728 kcal and 80 g pro/day.)  Nutritional Goals: meet % of estimated nutrient needs

## 2022-02-15 NOTE — PROGRESS NOTES
Patient ring cut off at bedside by RN, underlying wound. Pictures taken and documented in chart. Ring in speci-cup at bedside with label     . Electronically signed by Aniya Smith RN on 2/15/2022 at 7:46 AM

## 2022-02-15 NOTE — ED PROVIDER NOTES
[facility]     Emergency Department     Faculty Attestation    I performed a history and physical examination of the patient and discussed management with the resident. I have reviewed and agree with the residents findings including all diagnostic interpretations, and treatment plans as written. Any areas of disagreement are noted on the chart. I was personally present for the key portions of any procedures. I have documented in the chart those procedures where I was not present during the key portions. I have reviewed the emergency nurses triage note. I agree with the chief complaint, past medical history, past surgical history, allergies, medications, social and family history as documented unless otherwise noted below. Documentation of the HPI, Physical Exam and Medical Decision Making performed by scribmaryjane is based on my personal performance of the HPI, PE and MDM. For Physician Assistant/ Nurse Practitioner cases/documentation I have personally evaluated this patient and have completed at least one if not all key elements of the E/M (history, physical exam, and MDM). Additional findings are as noted. 18 yo F transfer post cardiac arrest then witnessed seizure, (cpr 18 minutes), hx crack use, pt in assisted during arrest  pe hr 103, orally intubated, janiya, normocephalic atraumatic, abdomen non tender, no distension, no rigidity, extremities x 4 atraumatic,     Ct no pe, ct head -, ct abdomen > ileus, cardiology consulted,   icu admit    EKG Interpretation    Interpreted by me  Sinus tach, hr 100, no ischemia, normal axis, qtc 516,     CRITICAL CARE: There was a high probability of clinically significant/life threatening deterioration in this patient's condition which required my urgent intervention. Total critical care time was 25 minutes. This excludes any time for separately reportable procedures.        [address], DO  02/15/22 0327       Blaise Patton, DO  02/15/22 Tejjarod, DO  02/15/22 7718

## 2022-02-15 NOTE — PLAN OF CARE
Problem: Non-Violent Restraints  Goal: Removal from restraints as soon as assessed to be safe  Outcome: Ongoing     Problem: Non-Violent Restraints  Goal: No harm/injury to patient while restraints in use  Outcome: Ongoing     Problem: Non-Violent Restraints  Goal: Patient's dignity will be maintained  Outcome: Ongoing     Problem: OXYGENATION/RESPIRATORY FUNCTION  Goal: Patient will maintain patent airway  2/15/2022 1402 by Bushra Yang RN  Outcome: Ongoing     Problem: OXYGENATION/RESPIRATORY FUNCTION  Goal: Patient will achieve/maintain normal respiratory rate/effort  Description: Respiratory rate and effort will be within normal limits for the patient  2/15/2022 1402 by Bushra Yang RN  Outcome: Ongoing     Problem: MECHANICAL VENTILATION  Goal: Patient will maintain patent airway  2/15/2022 1402 by Bushra Yang RN  Outcome: Ongoing     Problem: MECHANICAL VENTILATION  Goal: Oral health is maintained or improved  2/15/2022 1402 by Bushra Yang RN  Outcome: Ongoing     Problem: MECHANICAL VENTILATION  Goal: ET tube will be managed safely  2/15/2022 1402 by Bushra Yang RN  Outcome: Ongoing     Problem: SKIN INTEGRITY  Goal: Skin integrity is maintained or improved  2/15/2022 1402 by Bushra Yang RN  Outcome: Ongoing     Problem: MECHANICAL VENTILATION  Goal: Ability to express needs and understand communication  2/15/2022 1402 by Bushra Yang RN  Outcome: Not Met This Shift     Problem: MECHANICAL VENTILATION  Goal: Mobility/activity is maintained at optimum level for patient  2/15/2022 1402 by Bushra Yang RN  Outcome: Not Met This Shift     Problem: Nutrition  Goal: Optimal nutrition therapy  2/15/2022 1402 by Bushra Yang RN  Outcome: Not Met This Shift

## 2022-02-15 NOTE — PROCEDURES
Cooling Catheter Line Placement Procedure Note    Performed by: Mary Celestin DO    Indication: Need for post arrest cooling    Consent: Unable to be obtained due to the emergent nature of this procedure. Time out performed: Immediately prior to the procedure a \"time out\" was called to verify the correct patient, the correct procedure, equipment, support staff and site/side marked as required. All elements of maximal sterile barrier techniques were followed. Procedure: The patient was positioned appropriately and the skin over the left femoral vein was prepped with Chloraprep. Local anesthesia was not performed. A large bore needle was used to identify the vein. A guide wire was then inserted into the vein through the needle. A Quatro Cooling Catheter was then inserted into the vessel over the guide wire using the Seldinger technique. All ports showed good, free flowing blood return and were flushed with saline solution. The catheter was then securely fastened to the skin without sutures and covered with a sterile dressing. A post procedure X-ray was not indicated. The patient tolerated the procedure well.     Complications: None

## 2022-02-15 NOTE — H&P
Critical Care - History and Physical Examination    Patient's name:  Jenae Gil  Medical Record Number: [id number]  Patient's account/billing number: [de-identified]  Patient's YOB: 2000  Age: 19 y.o. Date of Admission: 2/15/2022  2:56 AM  Date of History and Physical Examination: 2/15/2022      Primary Care Physician: No primary care provider on file. Attending Physician: Nereyda Pittman Status: No Order    Chief complaint:   Chief Complaint   Patient presents with    Cardiac Arrest         HISTORY OF PRESENT ILLNESS:    18-year-old female, brought in by EMS from correctional facility after swallowing crack cocaine. Patient had a seizure, then experienced a PEA arrest requiring several rounds of epinephrine. ROSC was achieved prior to arrival.  Patient is hypotensive requiring vasopressor support. No seizure activity visualized since arrival.  Patient is intubated and sedated on fentanyl and Versed infusions    PAST MEDICAL HISTORY:     No past medical history on file. PAST SURGICAL HISTORY:     No past surgical history on file. ALLERGIES:      Not on File      HOME MEDS: :      Prior to Admission medications    Not on File       SOCIAL HISTORY:       TOBACCO:   has no history on file for tobacco use. ETOH:   has no history on file for alcohol use. DRUGS:  has no history on file for drug use. OCCUPATION:       FAMILY HISTORY:      No family history on file.     REVIEW OF SYSTEMS (ROS):     Cannot be obtainedpatient intubated    OBJECTIVE:     VITAL SIGNS:  BP 98/74   Pulse 88   Temp 97.9 °F (36.6 °C) (Oral)   Resp 13   SpO2 93%   Tmax over 24 hours:  Temp (24hrs), Av.9 °F (36.6 °C), Min:97.9 °F (36.6 °C), Max:97.9 °F (36.6 °C)      Patient Vitals for the past 8 hrs:   BP Temp Temp src Pulse Resp SpO2   02/15/22 0445 98/74   88 13    02/15/22 0415 91/66   91 16 93 %   02/15/22 0400 95/65   91 18 93 %   02/15/22 0345 93/67   94 15 96 %   02/15/22 0330 100/66   99 21 97 %   02/15/22 0315 111/77   103 24 98 %   02/15/22 0301     28 98 %   02/15/22 0256 (!) 130/94 97.9 °F (36.6 °C) Oral 82 23 98 %         Intake/Output Summary (Last 24 hours) at 2/15/2022 0505  Last data filed at 2/15/2022 0343  Gross per 24 hour   Intake 250 ml   Output    Net 250 ml     Date 02/15/22 0000 - 02/15/22 2359   Shift 6179-7742 2312-7553 6797-4747 24 Hour Total   INTAKE   I.V. 250   250   Shift Total 250   250   OUTPUT   Shift Total       Weight (kg)         Wt Readings from Last 3 Encounters:   No data found for Wt     There is no height or weight on file to calculate BMI. PHYSICAL EXAM:  Constitutional: Appears well, no distress, intubated/sedated  EENT: neck supple with midline trachea. Neck: Supple, symmetrical, trachea midline, no adenopathy,  no jvd, skin normal  Respiratory: clear to auscultation, no wheezes or rales and unlabored breathing. No intercostal tenderness, on vent  Cardiovascular: regular rate and rhythm, normal S1, S2, no murmur noted  Abdomen: soft, nontender, nondistended, no masses or organomegaly  Extremities:   no pedal edema, no clubbing or cyanosis  Neuro: Disconjugate gaze. Pupils reactive to light.   Intact gag, intact corneals    MEDICATIONS:  Scheduled Meds:   norepinephrine-sodium chloride        magnesium sulfate  2,000 mg IntraVENous Once     Continuous Infusions:   midazolam 6 mg/hr (02/15/22 0454)    fentaNYL 125 mcg/hr (02/15/22 0488)     PRN Meds:          ABGs:   No results found for: PHART, PH, KGG4YBP, PCO2, PO2ART, PO2, NPN3FRJ, HCO3, BEART, BE, THGBART, THB, PKV1TYQ, C3LIWJVT, O2SAT, FIO2    DATA:  Complete Blood Count:   Recent Labs     02/15/22  0342   WBC 19.6*   RBC 4.30   HGB 12.9   HCT 40.8   MCV 94.9   MCH 30.0   MCHC 31.6   RDW 13.4      MPV 9.4        Last 3 Blood Glucose:   Recent Labs     02/15/22  0342   GLUCOSE 45*        PT/INR:    Lab Results   Component Value Date    PROTIME 10.8 02/15/2022    INR 1.0 02/15/2022     PTT: Lab Results   Component Value Date    APTT 21.6 02/15/2022       Comprehensive Metabolic Profile:   Recent Labs     02/15/22  0342      K 4.0   *   CO2 15*   BUN 13   CREATININE 1.31*   GLUCOSE 45*   CALCIUM 8.4*   PROT 6.7   LABALBU 4.0   BILITOT <0.10*   ALKPHOS 78   *   *      Magnesium: No results found for: MG  Phosphorus: No results found for: PHOS  Ionized Calcium: No results found for: CAION     Urinalysis: No results found for: NITRU, COLORU, PHUR, LABCAST, WBCUA, RBCUA, MUCUS, TRICHOMONAS, YEAST, BACTERIA, CLARITYU, SPECGRAV, LEUKOCYTESUR, UROBILINOGEN, BILIRUBINUR, BLOODU, GLUCOSEU, KETUA, AMORPHOUS    HgBA1c:  No results found for: LABA1C  TSH:    Lab Results   Component Value Date    TSH 1.61 10/29/2013       Lactic Acid: No results found for: LACTA   Troponin: No results for input(s): TROPONINI in the last 72 hours. Electrocardiogram:       Last Echocardiogram findings:   (See actual reports for details)      Radiological imaging  XR CHEST PORTABLE    Result Date: 2/15/2022  EXAMINATION: ONE XRAY VIEW OF THE CHEST 2/15/2022 3:43 am COMPARISON: None. HISTORY: ORDERING SYSTEM PROVIDED HISTORY: post arrest et tube placmeent TECHNOLOGIST PROVIDED HISTORY: post arrest et tube placmeent Reason for Exam: et tube placement  post cardiac arrest  supine port FINDINGS: Endotracheal tube is located 4.4 cm above the diana. Nasogastric tube traverses the diaphragm. The lungs and pleural spaces are without acute focal process. The cardiomediastinal silhouette is without acute process. There is no evidence of pneumothorax. The osseous structures are without acute process. No acute process. Endotracheal tube located 4.4 cm above the diana       ASSESSMENT:     Active Problems:    Cardiac arrest [facility])  Resolved Problems:    * No resolved hospital problems.  *  Crack cocaine overdose  Seizure activity  PRATIMA    PLAN:     Acute respiratory failure and PEA arrest  -Continue Versed  -Continue fentanyl  -Levophed as needed for hypotension, goal MAP greater than 65.  -Trend troponins, start heparin drip  -Follow-up cardiology recommendationsmay need cooling    Crack cocaine ingestion  -Follow-up CT chest abdomen pelvis    PRATIMA  -Gentle hydration, NS at 100/h  -Monitor for improvement    Leukocytosis  -Monitor with daily labs  -No antibiotics at this point    pepcid bid GI ppx  DVT ppx: already on heparin ggt      Yo Faye DO,   ICU resident   [facility]  2/15/2022 5:05 AM    The critical care team assigned to the patient will be following up the patient in the intensive care unit. I have discussed the current plan with the critical care attending. The above mentioned assessment and plan will be reviewed again in detail by the critical care attending at bedside, and can be further changed or modified accordingly by the attending physician.

## 2022-02-15 NOTE — ED PROVIDER NOTES
[facility]  Emergency Department Encounter  EmergencyMedicine Resident     Pt Jyoti Rodriguez  MRN: [id number]  [place] 2000  Date of evaluation: 2/15/22  PCP:  No primary care provider on file. CHIEF COMPLAINT       Chief Complaint   Patient presents with    Cardiac Arrest       HISTORY OF PRESENT ILLNESS  (Location/Symptom, Timing/Onset, Context/Setting, Quality, Duration, Modifying Factors, Severity.)      Alex Sood is a 19 y.o. female who originally presented to [facility]. Patient became altered, collapsed.  was concerned for watching tonic-clonic movements on a security camera footage. Patient received 20 minutes CPR. Rhythm was asystole. 5 doses of 1 mg epinephrine. Patient got ROSC. Reportedly patient swallowed crack cocaine prior to incident. Arrives to our facility intubated. Was agitated on the ventilator, received total 450 mg IV ketamine. PAST MEDICAL / SURGICAL / SOCIAL / FAMILY HISTORY      has no past medical history on file. Unknown     has no past surgical history on file. Unknown    Social History     Socioeconomic History    Marital status: Single     Spouse name: Not on file    Number of children: Not on file    Years of education: Not on file    Highest education level: Not on file   Occupational History    Not on file   Tobacco Use    Smoking status: Not on file    Smokeless tobacco: Not on file   Substance and Sexual Activity    Alcohol use: Not on file    Drug use: Not on file    Sexual activity: Not on file   Other Topics Concern    Not on file   Social History Narrative    Not on file     Social Determinants of Health     Financial Resource Strain:     Difficulty of Paying Living Expenses: Not on file   Food Insecurity:     Worried About Running Out of Food in the Last Year: Not on file    Luci of Food in the Last Year: Not on file   Transportation Needs:     Lack of Transportation (Medical):  Not on file    Lack of Transportation (Non-Medical): Not on file   Physical Activity:     Days of Exercise per Week: Not on file    Minutes of Exercise per Session: Not on file   Stress:     Feeling of Stress : Not on file   Social Connections:     Frequency of Communication with Friends and Family: Not on file    Frequency of Social Gatherings with Friends and Family: Not on file    Attends Mandaeism Services: Not on file    Active Member of [address] [place] [address] or Organizations: Not on file    Attends Club or Organization Meetings: Not on file    Marital Status: Not on file   Intimate Partner Violence:     Fear of Current or Ex-Partner: Not on file    Emotionally Abused: Not on file    Physically Abused: Not on file    Sexually Abused: Not on file   Housing Stability:     Unable to Pay for Housing in the Last Year: Not on file    Number of [place] in the Last Year: Not on file    Unstable Housing in the Last Year: Not on file       No family history on file. Allergies:  Patient has no allergy information on record. Home Medications:  Prior to Admission medications    Not on File       REVIEW OF SYSTEMS    (2-9 systems for level 4, 10 or more for level 5)      Review of Systems   Sedated, ventilated    PHYSICAL EXAM   (up to 7 for level 4, 8 or more for level 5)      INITIAL VITALS:   BP 98/74   Pulse 88   Temp 97.9 °F (36.6 °C) (Oral)   Resp 13   SpO2 93%     Physical Exam  Constitutional:       General: Mechanical ventilation, IV sedation    HENT:      Head: Normocephalic and atraumatic. Nose: Nose normal.      Mouth/Throat: Mucous membranes are moist.  Uvula midline no oropharyngeal edema. Pharynx: Oropharynx is clear. Eyes:      Extraocular Movements: None     Conjunctiva/sclera: Conjunctivae normal.      Pupils: Right pupil 6 mm sluggish, left pupil 3 mm sluggish.   Round     Neck:      Musculoskeletal: Normal passive range of motion no bony deformity    Cardiovascular:      Rate and Rhythm: Normal rate and regular rhythm. Pulses: Normal pulses. Heart sounds: Normal heart sounds. No murmur. Pulmonary:      Effort: Mechanical ventilation, coarse inspiratory expiratory sounds diffusely    Abdominal:      General: Abdomen is obese. Bowel sounds are normal.      Tenderness: There is no abdominal tenderness. Musculoskeletal:     Normal range of motion. General: No swelling or tenderness. No LE edema    Skin:     General: Skin is warm. Capillary Refill: Capillary refill takes less than 2 seconds. Coloration: Skin is not jaundiced. Neurological:      General: Withdraws extremities moves all extremities on Versed and fentanyl for sedation.   Gag intact      DIFFERENTIAL  DIAGNOSIS     PLAN (LABS / IMAGING / EKG):  Orders Placed This Encounter   Procedures    COVID-19, Rapid    CT Head WO Contrast    XR CHEST PORTABLE    CT CHEST PULMONARY EMBOLISM W CONTRAST    CT ABDOMEN PELVIS W IV CONTRAST Additional Contrast? None    CBC Auto Differential    Comprehensive Metabolic Panel w/ Reflex to MG    Troponin    Brain Natriuretic Peptide    Protime-INR    APTT    TOX SCR, BLD, ED    Urine Drug Screen    HCG Qualitative, Serum    Troponin    Basic Metabolic Panel w/ Reflex to MG    CBC auto differential    APTT    Diet NPO    Vital signs per unit routine    Daily weights    Intake and output    Place intermittent pneumatic compression device    Telemetry monitoring - continuous duration    Up as tolerated    Insert indwelling urinary catheter    Discontinue indwelling urinary catheter when documented indications no longer apply per hospital policy    Full Code    Inpatient consult to Cardiology    Mechanical ventilation    Pulse oximetry, continuous    End Tidal CO2 Continuous    ABG draw    Initiate Oxygen Therapy Protocol    Respiratory care evaluation only    EKG 12 Lead    Insert peripheral IV    PATIENT STATUS (FROM ED OR OR/PROCEDURAL) Inpatient    Restraints non-violent or non-self-destructive       MEDICATIONS ORDERED:  Orders Placed This Encounter   Medications    norepinephrine-sodium chloride (LEVOPHED) 16-0.9 MG/250ML-% infusion     [place]: cabinet override    midazolam (VERSED) 1 mg/mL in D5W infusion    fentaNYL 20 mcg/mL Infusion    MIDAZOLAM 1 MG/ML IN D5W INFUSION     Genet Chavez: cabinet override    dextrose bolus (PED-NOLBERTO) 10% 250 mL    magnesium sulfate 2000 mg in 50 mL IVPB premix    iopamidol (ISOVUE-370) 76 % injection 75 mL    sodium chloride flush 0.9 % injection 5-40 mL    sodium chloride flush 0.9 % injection 5-40 mL    0.9 % sodium chloride infusion    OR Linked Order Group     ondansetron (ZOFRAN-ODT) disintegrating tablet 4 mg     ondansetron (ZOFRAN) injection 4 mg    polyethylene glycol (GLYCOLAX) packet 17 g    OR Linked Order Group     acetaminophen (TYLENOL) tablet 650 mg     acetaminophen (TYLENOL) suppository 650 mg    heparin (porcine) injection 60 Units/kg    heparin (porcine) injection 60 Units/kg    heparin (porcine) injection 30 Units/kg    heparin 25,000 units in dextrose 5 % 250 mL infusion (rate based)           DIAGNOSTIC RESULTS / EMERGENCY DEPARTMENT COURSE / MDM     LABS:  Results for orders placed or performed during the hospital encounter of 02/15/22   COVID-19, Rapid    Specimen: Nasopharyngeal Swab   Result Value Ref Range    Specimen Description . NASOPHARYNGEAL SWAB     SARS-CoV-2, Rapid Not Detected Not Detected   CBC Auto Differential   Result Value Ref Range    WBC 19.6 (H) 3.5 - 11.3 k/uL    RBC 4.30 3.95 - 5.11 m/uL    Hemoglobin 12.9 11.9 - 15.1 g/dL    Hematocrit 40.8 36.3 - 47.1 %    MCV 94.9 82.6 - 102.9 fL    MCH 30.0 25.2 - 33.5 pg    MCHC 31.6 28.4 - 34.8 g/dL    RDW 13.4 11.8 - 14.4 %    Platelets 364 812 - 655 k/uL    MPV 9.4 8.1 - 13.5 fL    NRBC Automated 0.0 0.0 per 100 WBC    Differential Type NOT REPORTED     Seg Neutrophils 83 (H) 36 - 65 % Screen   Result Value Ref Range    Amphetamine Screen, Ur NEGATIVE NEGATIVE    Barbiturate Screen, Ur NEGATIVE NEGATIVE    Benzodiazepine Screen, Urine NEGATIVE NEGATIVE    Cocaine Metabolite, Urine NEGATIVE NEGATIVE    Methadone Screen, Urine NEGATIVE NEGATIVE    Opiates, Urine NEGATIVE NEGATIVE    Phencyclidine, Urine NEGATIVE NEGATIVE    Propoxyphene, Urine NOT REPORTED NEGATIVE    Cannabinoid Scrn, Ur NEGATIVE NEGATIVE    Oxycodone Screen, Ur NEGATIVE NEGATIVE    Methamphetamine, Urine NOT REPORTED NEGATIVE    Tricyclic Antidepressants, Urine NOT REPORTED NEGATIVE    MDMA, Urine NOT REPORTED NEGATIVE    Buprenorphine Urine NOT REPORTED NEGATIVE    Test Information       Assay provides medical screening only. The absence of expected drug(s) and/or metabolite(s) may indicate diluted or adulterated urine, limitations of testing or timing of collection. HCG Qualitative, Serum   Result Value Ref Range    hCG Qual NEGATIVE NEGATIVE         RADIOLOGY:  CT Head WO Contrast    Result Date: 2/15/2022  EXAMINATION: CT OF THE HEAD WITHOUT CONTRAST  2/15/2022 4:08 am TECHNIQUE: CT of the head was performed without the administration of intravenous contrast. Dose modulation, iterative reconstruction, and/or weight based adjustment of the mA/kV was utilized to reduce the radiation dose to as low as reasonably achievable. COMPARISON: None. HISTORY: ORDERING SYSTEM PROVIDED HISTORY: cariac arrest pupils unequal TECHNOLOGIST PROVIDED HISTORY: cariac arrest pupils unequal Decision Support Exception - unselect if not a suspected or confirmed emergency medical condition->Emergency Medical Condition (MA) Is the patient pregnant?->No Reason for Exam: cariac arrest pupils unequal FINDINGS: BRAIN/VENTRICLES: There is no acute intracranial hemorrhage, mass effect or midline shift. No abnormal extra-axial fluid collection. The gray-white differentiation is maintained without evidence of an acute infarct.   There is no evidence of hydrocephalus. ORBITS: The visualized portion of the orbits demonstrate no acute abnormality. SINUSES: The visualized paranasal sinuses and mastoid air cells demonstrate no acute abnormality. SOFT TISSUES/SKULL:  No acute abnormality of the visualized skull or soft tissues. No acute intracranial abnormality. CT ABDOMEN PELVIS W IV CONTRAST Additional Contrast? None    Result Date: 2/15/2022  EXAMINATION: CT OF THE ABDOMEN AND PELVIS WITH CONTRAST 2/15/2022 4:09 am TECHNIQUE: CT of the abdomen and pelvis was performed with the administration of intravenous contrast. Multiplanar reformatted images are provided for review. Dose modulation, iterative reconstruction, and/or weight based adjustment of the mA/kV was utilized to reduce the radiation dose to as low as reasonably achievable. COMPARISON: None HISTORY: ORDERING SYSTEM PROVIDED HISTORY: post cardiac arrest, 20 minutes compression, ams, swallowed crack prior to arrest TECHNOLOGIST PROVIDED HISTORY: post cardiac arrest, 20 minutes compression, ams, swallowed crack prior to arrest Reason for Exam: cardiac arrest pupils unequal FINDINGS: Lower Chest: Bibasilar infiltrates are identified greater on the left likely representing atelectasis. Organs: The solid organs are unremarkable. The gallbladder is intact. No hydronephrosis is appreciated. GI/Bowel: There is diffuse dilation of the small and large bowel. The bowel loops are fluid-filled. The distal colon contains moderate amount of stool. These changes are compatible with moderate ileus. Moderate-sized fecalith is identified within the rectal vault. Pelvis: The urinary bladder is collapsed. Eles catheter is noted. Peritoneum/Retroperitoneum: No lymphadenopathy is detected. The aorta is intact. Bones/Soft Tissues: No acute osseous abnormality is identified. Diffuse dilation of the small and large bowel likely representing a moderate ileus.   Moderate-sized fecalith is identified within the rectal vault. Bibasilar infiltrates greater on the left likely representing dependent atelectasis. Pulmonary contusion is considered less likely noting history of extended chest compressions for cardiac arrest. RECOMMENDATIONS: Unavailable     XR CHEST PORTABLE    Result Date: 2/16/2022  EXAMINATION: ONE XRAY VIEW OF THE CHEST 2/16/2022 6:16 am COMPARISON: 2/15/2022 HISTORY: ORDERING SYSTEM PROVIDED HISTORY: follow up cxr TECHNOLOGIST PROVIDED HISTORY: Follow up cxr Reason for Exam: supine port FINDINGS: Endotracheal tube and enteric tube remain in place. There is a central venous catheter overlying intrahepatic IVC and right atrium. Heart size is stable. No significant vascular congestion. No focal airspace consolidation, pneumothorax, or pleural effusion. No free air beneath the diaphragm. 1.  New central venous catheter overlying IVC and right atrium. Endotracheal and enteric tubes remain in place. 2.  No acute intrathoracic process. XR CHEST PORTABLE    Result Date: 2/15/2022  EXAMINATION: ONE XRAY VIEW OF THE CHEST 2/15/2022 3:43 am COMPARISON: None. HISTORY: ORDERING SYSTEM PROVIDED HISTORY: post arrest et tube placmeent TECHNOLOGIST PROVIDED HISTORY: post arrest et tube placmeent Reason for Exam: et tube placement  post cardiac arrest  supine port FINDINGS: Endotracheal tube is located 4.4 cm above the diana. Nasogastric tube traverses the diaphragm. The lungs and pleural spaces are without acute focal process. The cardiomediastinal silhouette is without acute process. There is no evidence of pneumothorax. The osseous structures are without acute process. No acute process. Endotracheal tube located 4.4 cm above the diana     CT CHEST PULMONARY EMBOLISM W CONTRAST    Result Date: 2/15/2022  EXAMINATION: CTA OF THE CHEST 2/15/2022 4:09 am TECHNIQUE: CTA of the chest was performed after the administration of intravenous contrast.  Multiplanar reformatted images are provided for review.   MIP images are provided for review. Dose modulation, iterative reconstruction, and/or weight based adjustment of the mA/kV was utilized to reduce the radiation dose to as low as reasonably achievable. COMPARISON: None HISTORY: ORDERING SYSTEM PROVIDED HISTORY: post cardiac arrest, 20 minutes compression, ams, swallowed crack prior to arrest TECHNOLOGIST PROVIDED HISTORY: post cardiac arrest, 20 minutes compression, ams, swallowed crack prior to arrest Reason for Exam: cariac arrest pupils unequal FINDINGS: Pulmonary Arteries: Pulmonary arteries are adequately opacified for evaluation. No evidence of intraluminal filling defect to suggest pulmonary embolism. Main pulmonary artery is normal in caliber. Mediastinum: No evidence of mediastinal lymphadenopathy. The heart and pericardium demonstrate no acute abnormality. There is no acute abnormality of the thoracic aorta. Lungs/pleura: Small areas of airspace disease identified within the posterior aspect of the right upper lobe as well as the dependent portion of both lower lobes. These changes may represent combination of contusion and/or atelectasis. Patient with history of chest compressions. Upper Abdomen: Limited images of the upper abdomen are unremarkable. Soft Tissues/Bones: No acute bone or soft tissue abnormality. Endotracheal tube and nasogastric tube are identified. No evidence of pulmonary embolism. Mild airspace disease within the right upper lobe posteriorly as well as the posterior dependent portions of both lung bases. These changes may represent atelectasis versus mild contusion secondary to prolonged chest compressions.  RECOMMENDATIONS: Unavailable       EKG  EKG Interpretation    Interpreted by me    Rhythm: normal sinus   Rate: normal  Axis: normal  Ectopy: none  Conduction: Prolonged QTC, 516  ST Segments: no acute change  T Waves: no acute change  Q Waves: none    Clinical Impression: no acute changes and normal EKG    All EKG's are interpreted by the Emergency Department Physician who either signs or Co-signs this chart in the absence of a cardiologist.    EMERGENCY DEPARTMENT COURSE:  On mechanical ventilation moves all extremities agitated nonpurposeful gag intact pupillary discrepancy as above no external signs of trauma on Versed and fentanyl infusion. Initially was on Levophed infusion however on arrival patient was slightly hypertensive, Levophed discontinued. Heart rate normal no ST changes. Point-of-care labs revealed glucose 48, got 250 mL D10 bolus. We will plan for troponins, CT PE rule out, CT head, mechanical support. Concern for swallowing crack rock, will also CT abdomen. ED tox work-up, test for COVID, pregnancy, urine drug screen, coags. QT prolonged, will give magnesium. Initial troponin I 116, consulting cardiology will trend. ED tox negative, urine drug screen negative. U tox negative, ED tox panel negative. CT PE  negative, CT head negative    Cardiology said okay for cooling as long as no contraindications otherwise no acute interventions at this time. PROCEDURES:  None    CONSULTS:  IP CONSULT TO CARDIOLOGY    CRITICAL CARE:  None    FINAL IMPRESSION      1. Cardiac arrest ([facility] [address]          DISPOSITION / PLAN     DISPOSITION Admitted 02/15/2022 03:16:45 AM      PATIENT REFERRED TO:  No follow-up provider specified.     DISCHARGE MEDICATIONS:  New Prescriptions    No medications on file       Radha Erickson MD  Emergency Medicine Resident    (Please note that portions of thisnote were completed with a voice recognition program.  Efforts were made to edit the dictations but occasionally words are mis-transcribed.)       Radha Erickson MD  Resident  02/16/22 3892

## 2022-02-16 ENCOUNTER — APPOINTMENT (OUTPATIENT)
Dept: GENERAL RADIOLOGY | Age: 22
DRG: 817 | End: 2022-02-16
Payer: MEDICAID

## 2022-02-16 LAB
ABSOLUTE EOS #: <0.03 K/UL (ref 0–0.44)
ABSOLUTE IMMATURE GRANULOCYTE: 0.06 K/UL (ref 0–0.3)
ABSOLUTE LYMPH #: 1.46 K/UL (ref 1.1–3.7)
ABSOLUTE MONO #: 0.95 K/UL (ref 0.1–1.2)
ALBUMIN SERPL-MCNC: 3.4 G/DL (ref 3.5–5.2)
ALBUMIN/GLOBULIN RATIO: 1.3 (ref 1–2.5)
ALLEN TEST: ABNORMAL
ALP BLD-CCNC: 58 U/L (ref 35–104)
ALT SERPL-CCNC: 280 U/L (ref 5–33)
ANION GAP SERPL CALCULATED.3IONS-SCNC: 14 MMOL/L (ref 9–17)
ANION GAP SERPL CALCULATED.3IONS-SCNC: 14 MMOL/L (ref 9–17)
ANION GAP SERPL CALCULATED.3IONS-SCNC: 15 MMOL/L (ref 9–17)
AST SERPL-CCNC: 194 U/L
BASOPHILS # BLD: 0 % (ref 0–2)
BASOPHILS ABSOLUTE: 0.05 K/UL (ref 0–0.2)
BILIRUB SERPL-MCNC: 0.23 MG/DL (ref 0.3–1.2)
BILIRUBIN DIRECT: <0.08 MG/DL
BILIRUBIN, INDIRECT: ABNORMAL MG/DL (ref 0–1)
BUN BLDV-MCNC: 12 MG/DL (ref 6–20)
BUN BLDV-MCNC: 13 MG/DL (ref 6–20)
BUN BLDV-MCNC: 13 MG/DL (ref 6–20)
BUN/CREAT BLD: ABNORMAL (ref 9–20)
CALCIUM IONIZED: 1.08 MMOL/L (ref 1.13–1.33)
CALCIUM IONIZED: 1.11 MMOL/L (ref 1.13–1.33)
CALCIUM SERPL-MCNC: 7.7 MG/DL (ref 8.6–10.4)
CALCIUM SERPL-MCNC: 7.9 MG/DL (ref 8.6–10.4)
CALCIUM SERPL-MCNC: 8.1 MG/DL (ref 8.6–10.4)
CHLORIDE BLD-SCNC: 109 MMOL/L (ref 98–107)
CHLORIDE BLD-SCNC: 110 MMOL/L (ref 98–107)
CHLORIDE BLD-SCNC: 112 MMOL/L (ref 98–107)
CO2: 17 MMOL/L (ref 20–31)
CO2: 18 MMOL/L (ref 20–31)
CO2: 18 MMOL/L (ref 20–31)
CREAT SERPL-MCNC: 0.97 MG/DL (ref 0.5–0.9)
CREAT SERPL-MCNC: 1.05 MG/DL (ref 0.5–0.9)
CREAT SERPL-MCNC: 1.05 MG/DL (ref 0.5–0.9)
DIFFERENTIAL TYPE: ABNORMAL
EKG ATRIAL RATE: 100 BPM
EKG P AXIS: 47 DEGREES
EKG P-R INTERVAL: 162 MS
EKG Q-T INTERVAL: 400 MS
EKG QRS DURATION: 98 MS
EKG QTC CALCULATION (BAZETT): 516 MS
EKG R AXIS: 49 DEGREES
EKG T AXIS: 40 DEGREES
EKG VENTRICULAR RATE: 100 BPM
EOSINOPHILS RELATIVE PERCENT: 0 % (ref 1–4)
FIO2: 30
GFR AFRICAN AMERICAN: >60 ML/MIN
GFR NON-AFRICAN AMERICAN: >60 ML/MIN
GFR SERPL CREATININE-BSD FRML MDRD: ABNORMAL ML/MIN/{1.73_M2}
GLOBULIN: ABNORMAL G/DL (ref 1.5–3.8)
GLUCOSE BLD-MCNC: 110 MG/DL (ref 65–105)
GLUCOSE BLD-MCNC: 62 MG/DL (ref 70–99)
GLUCOSE BLD-MCNC: 69 MG/DL (ref 74–100)
GLUCOSE BLD-MCNC: 78 MG/DL (ref 65–105)
GLUCOSE BLD-MCNC: 79 MG/DL (ref 70–99)
GLUCOSE BLD-MCNC: 84 MG/DL (ref 70–99)
HCT VFR BLD CALC: 36.6 % (ref 36.3–47.1)
HEMOGLOBIN: 12 G/DL (ref 11.9–15.1)
IMMATURE GRANULOCYTES: 0 %
LACTIC ACID, WHOLE BLOOD: 0.6 MMOL/L (ref 0.7–2.1)
LACTIC ACID, WHOLE BLOOD: 0.6 MMOL/L (ref 0.7–2.1)
LACTIC ACID: ABNORMAL MMOL/L
LACTIC ACID: ABNORMAL MMOL/L
LYMPHOCYTES # BLD: 9 % (ref 24–43)
MAGNESIUM: 2 MG/DL (ref 1.6–2.6)
MAGNESIUM: 2.1 MG/DL (ref 1.6–2.6)
MAGNESIUM: 2.1 MG/DL (ref 1.6–2.6)
MCH RBC QN AUTO: 30.5 PG (ref 25.2–33.5)
MCHC RBC AUTO-ENTMCNC: 32.8 G/DL (ref 28.4–34.8)
MCV RBC AUTO: 92.9 FL (ref 82.6–102.9)
MODE: ABNORMAL
MONOCYTES # BLD: 6 % (ref 3–12)
NEGATIVE BASE EXCESS, ART: 4 (ref 0–2)
NRBC AUTOMATED: 0 PER 100 WBC
O2 DEVICE/FLOW/%: ABNORMAL
PARTIAL THROMBOPLASTIN TIME: 33.7 SEC (ref 20.5–30.5)
PARTIAL THROMBOPLASTIN TIME: 41.1 SEC (ref 20.5–30.5)
PARTIAL THROMBOPLASTIN TIME: 42.6 SEC (ref 20.5–30.5)
PARTIAL THROMBOPLASTIN TIME: 51.6 SEC (ref 20.5–30.5)
PATIENT TEMP: 38.2
PDW BLD-RTO: 13.6 % (ref 11.8–14.4)
PHOSPHORUS: 2.8 MG/DL (ref 2.6–4.5)
PHOSPHORUS: 3.5 MG/DL (ref 2.6–4.5)
PLATELET # BLD: 261 K/UL (ref 138–453)
PLATELET ESTIMATE: ABNORMAL
PMV BLD AUTO: 9.4 FL (ref 8.1–13.5)
POC HCO3: 20.9 MMOL/L (ref 21–28)
POC O2 SATURATION: 98 % (ref 94–98)
POC PCO2 TEMP: 38 MM HG
POC PCO2: 35.9 MM HG (ref 35–48)
POC PH TEMP: 7.36
POC PH: 7.37 (ref 7.35–7.45)
POC PO2 TEMP: 109 MM HG
POC PO2: 101.1 MM HG (ref 83–108)
POSITIVE BASE EXCESS, ART: ABNORMAL (ref 0–3)
POTASSIUM SERPL-SCNC: 3.5 MMOL/L (ref 3.7–5.3)
POTASSIUM SERPL-SCNC: 3.6 MMOL/L (ref 3.7–5.3)
POTASSIUM SERPL-SCNC: 4.2 MMOL/L (ref 3.7–5.3)
RBC # BLD: 3.94 M/UL (ref 3.95–5.11)
RBC # BLD: ABNORMAL 10*6/UL
SAMPLE SITE: ABNORMAL
SEG NEUTROPHILS: 85 % (ref 36–65)
SEGMENTED NEUTROPHILS ABSOLUTE COUNT: 14.16 K/UL (ref 1.5–8.1)
SODIUM BLD-SCNC: 141 MMOL/L (ref 135–144)
SODIUM BLD-SCNC: 141 MMOL/L (ref 135–144)
SODIUM BLD-SCNC: 145 MMOL/L (ref 135–144)
TCO2 (CALC), ART: ABNORMAL MMOL/L (ref 22–29)
TOTAL PROTEIN: 6 G/DL (ref 6.4–8.3)
WBC # BLD: 16.7 K/UL (ref 3.5–11.3)
WBC # BLD: ABNORMAL 10*3/UL

## 2022-02-16 PROCEDURE — 2500000003 HC RX 250 WO HCPCS: Performed by: STUDENT IN AN ORGANIZED HEALTH CARE EDUCATION/TRAINING PROGRAM

## 2022-02-16 PROCEDURE — 6360000002 HC RX W HCPCS: Performed by: STUDENT IN AN ORGANIZED HEALTH CARE EDUCATION/TRAINING PROGRAM

## 2022-02-16 PROCEDURE — 83605 ASSAY OF LACTIC ACID: CPT

## 2022-02-16 PROCEDURE — 85025 COMPLETE CBC W/AUTO DIFF WBC: CPT

## 2022-02-16 PROCEDURE — 85730 THROMBOPLASTIN TIME PARTIAL: CPT

## 2022-02-16 PROCEDURE — 93010 ELECTROCARDIOGRAM REPORT: CPT | Performed by: INTERNAL MEDICINE

## 2022-02-16 PROCEDURE — 6370000000 HC RX 637 (ALT 250 FOR IP): Performed by: STUDENT IN AN ORGANIZED HEALTH CARE EDUCATION/TRAINING PROGRAM

## 2022-02-16 PROCEDURE — 83735 ASSAY OF MAGNESIUM: CPT

## 2022-02-16 PROCEDURE — 80048 BASIC METABOLIC PNL TOTAL CA: CPT

## 2022-02-16 PROCEDURE — 71045 X-RAY EXAM CHEST 1 VIEW: CPT

## 2022-02-16 PROCEDURE — 82330 ASSAY OF CALCIUM: CPT

## 2022-02-16 PROCEDURE — 82803 BLOOD GASES ANY COMBINATION: CPT

## 2022-02-16 PROCEDURE — 80076 HEPATIC FUNCTION PANEL: CPT

## 2022-02-16 PROCEDURE — 2580000003 HC RX 258: Performed by: GENERAL PRACTICE

## 2022-02-16 PROCEDURE — 6360000002 HC RX W HCPCS: Performed by: GENERAL PRACTICE

## 2022-02-16 PROCEDURE — 2000000000 HC ICU R&B

## 2022-02-16 PROCEDURE — 2700000000 HC OXYGEN THERAPY PER DAY

## 2022-02-16 PROCEDURE — 94003 VENT MGMT INPAT SUBQ DAY: CPT

## 2022-02-16 PROCEDURE — 82947 ASSAY GLUCOSE BLOOD QUANT: CPT

## 2022-02-16 PROCEDURE — 99291 CRITICAL CARE FIRST HOUR: CPT | Performed by: INTERNAL MEDICINE

## 2022-02-16 PROCEDURE — 6370000000 HC RX 637 (ALT 250 FOR IP): Performed by: GENERAL PRACTICE

## 2022-02-16 PROCEDURE — 84100 ASSAY OF PHOSPHORUS: CPT

## 2022-02-16 PROCEDURE — 37799 UNLISTED PX VASCULAR SURGERY: CPT

## 2022-02-16 PROCEDURE — 94761 N-INVAS EAR/PLS OXIMETRY MLT: CPT

## 2022-02-16 PROCEDURE — 2500000003 HC RX 250 WO HCPCS: Performed by: GENERAL PRACTICE

## 2022-02-16 RX ORDER — POTASSIUM CHLORIDE 7.45 MG/ML
10 INJECTION INTRAVENOUS
Status: DISCONTINUED | OUTPATIENT
Start: 2022-02-16 | End: 2022-02-16

## 2022-02-16 RX ORDER — POTASSIUM CHLORIDE 29.8 MG/ML
20 INJECTION INTRAVENOUS
Status: COMPLETED | OUTPATIENT
Start: 2022-02-16 | End: 2022-02-16

## 2022-02-16 RX ADMIN — SODIUM CHLORIDE 100 ML/HR: 9 INJECTION, SOLUTION INTRAVENOUS at 03:12

## 2022-02-16 RX ADMIN — DEXTROSE MONOHYDRATE 12.5 G: 25 INJECTION, SOLUTION INTRAVENOUS at 04:43

## 2022-02-16 RX ADMIN — POTASSIUM CHLORIDE 20 MEQ: 29.8 INJECTION, SOLUTION INTRAVENOUS at 07:35

## 2022-02-16 RX ADMIN — HEPARIN SODIUM 18 UNITS/KG/HR: 5000 INJECTION INTRAVENOUS; SUBCUTANEOUS at 06:54

## 2022-02-16 RX ADMIN — ACETAMINOPHEN 650 MG: 325 TABLET ORAL at 04:42

## 2022-02-16 RX ADMIN — SODIUM CHLORIDE, PRESERVATIVE FREE 10 ML: 5 INJECTION INTRAVENOUS at 20:15

## 2022-02-16 RX ADMIN — SODIUM CHLORIDE, PRESERVATIVE FREE 10 ML: 5 INJECTION INTRAVENOUS at 08:15

## 2022-02-16 RX ADMIN — Medication 125 MCG/HR: at 00:11

## 2022-02-16 RX ADMIN — HEPARIN SODIUM 2000 UNITS: 1000 INJECTION INTRAVENOUS; SUBCUTANEOUS at 19:04

## 2022-02-16 RX ADMIN — FAMOTIDINE 20 MG: 10 INJECTION INTRAVENOUS at 08:14

## 2022-02-16 RX ADMIN — Medication 8 MG/HR: at 07:44

## 2022-02-16 RX ADMIN — SODIUM CHLORIDE: 9 INJECTION, SOLUTION INTRAVENOUS at 16:09

## 2022-02-16 RX ADMIN — POTASSIUM CHLORIDE 20 MEQ: 29.8 INJECTION, SOLUTION INTRAVENOUS at 06:34

## 2022-02-16 RX ADMIN — HEPARIN SODIUM 2000 UNITS: 1000 INJECTION INTRAVENOUS; SUBCUTANEOUS at 00:17

## 2022-02-16 RX ADMIN — FAMOTIDINE 20 MG: 10 INJECTION INTRAVENOUS at 20:15

## 2022-02-16 RX ADMIN — Medication 150 MCG/HR: at 07:22

## 2022-02-16 RX ADMIN — HEPARIN SODIUM 2000 UNITS: 1000 INJECTION INTRAVENOUS; SUBCUTANEOUS at 05:40

## 2022-02-16 RX ADMIN — CHLORHEXIDINE GLUCONATE 15 ML: 1.2 RINSE ORAL at 20:19

## 2022-02-16 RX ADMIN — ASPIRIN 81 MG: 81 TABLET, CHEWABLE ORAL at 08:14

## 2022-02-16 ASSESSMENT — PULMONARY FUNCTION TESTS
PIF_VALUE: 15
PIF_VALUE: 16
PIF_VALUE: 13
PIF_VALUE: 20
PIF_VALUE: 13
PIF_VALUE: 14
PIF_VALUE: 15
PIF_VALUE: 12
PIF_VALUE: 16
PIF_VALUE: 10
PIF_VALUE: 13
PIF_VALUE: 16
PIF_VALUE: 11
PIF_VALUE: 16
PIF_VALUE: 15
PIF_VALUE: 14
PIF_VALUE: 17
PIF_VALUE: 15
PIF_VALUE: 17
PIF_VALUE: 14
PIF_VALUE: 12
PIF_VALUE: 14
PIF_VALUE: 14
PIF_VALUE: 13
PIF_VALUE: 11
PIF_VALUE: 14
PIF_VALUE: 10
PIF_VALUE: 14
PIF_VALUE: 13
PIF_VALUE: 19
PIF_VALUE: 17
PIF_VALUE: 17
PIF_VALUE: 11
PIF_VALUE: 7
PIF_VALUE: 14
PIF_VALUE: 14
PIF_VALUE: 9
PIF_VALUE: 18
PIF_VALUE: 17
PIF_VALUE: 17
PIF_VALUE: 14
PIF_VALUE: 9
PIF_VALUE: 20
PIF_VALUE: 11
PIF_VALUE: 19
PIF_VALUE: 14
PIF_VALUE: 13
PIF_VALUE: 13
PIF_VALUE: 12
PIF_VALUE: 17
PIF_VALUE: 32
PIF_VALUE: 13
PIF_VALUE: 18
PIF_VALUE: 15
PIF_VALUE: 13
PIF_VALUE: 18
PIF_VALUE: 18
PIF_VALUE: 14
PIF_VALUE: 15
PIF_VALUE: 17
PIF_VALUE: 13
PIF_VALUE: 16
PIF_VALUE: 14
PIF_VALUE: 15
PIF_VALUE: 15
PIF_VALUE: 17
PIF_VALUE: 20
PIF_VALUE: 16
PIF_VALUE: 4
PIF_VALUE: 18

## 2022-02-16 NOTE — PLAN OF CARE
Problem: Non-Violent Restraints  Goal: No harm/injury to patient while restraints in use  2/16/2022 0904 by Tyesha Patel RN  Outcome: Met This Shift     Problem: Non-Violent Restraints  Goal: Patient's dignity will be maintained  2/16/2022 0904 by Tyesha Patel RN  Outcome: Met This Shift     Problem: Non-Violent Restraints  Goal: Removal from restraints as soon as assessed to be safe  2/16/2022 0904 by Tyesha Patel RN  Outcome: Not Met This Shift     Problem: OXYGENATION/RESPIRATORY FUNCTION  Goal: Patient will maintain patent airway  2/16/2022 0904 by Tyesha Patel RN  Outcome: Ongoing  2/16/2022 0841 by Parisa Smallwood RCP  Outcome: Ongoing  2/15/2022 2201 by Yamile De La Cruz RCP  Outcome: Ongoing  2/15/2022 1944 by Nancy Child RN  Outcome: Ongoing  Goal: Patient will achieve/maintain normal respiratory rate/effort  Description: Respiratory rate and effort will be within normal limits for the patient  2/16/2022 0904 by Tyesha Patel RN  Outcome: Ongoing  2/16/2022 0841 by Parisa Smallwood RCP  Outcome: Ongoing  2/15/2022 2201 by Yamile De La Cruz RCP  Outcome: Ongoing  2/15/2022 1944 by Nancy Child RN  Outcome: Ongoing     Problem: MECHANICAL VENTILATION  Goal: Patient will maintain patent airway  2/16/2022 0904 by Tyesha Patel RN  Outcome: Ongoing  2/16/2022 0841 by Parisa Smallwood RCP  Outcome: Ongoing  2/15/2022 2201 by Yamile De La Cruz RCP  Outcome: Ongoing  2/15/2022 1944 by Nancy Child RN  Outcome: Ongoing  Goal: Oral health is maintained or improved  2/16/2022 0904 by Tyesha Patel RN  Outcome: Ongoing  2/16/2022 0841 by Parisa Smallwood RCP  Outcome: Ongoing  2/15/2022 2201 by Yamile De La Cruz RCP  Outcome: Ongoing  2/15/2022 1944 by Nancy Child RN  Outcome: Ongoing  Goal: ET tube will be managed safely  2/16/2022 0904 by Tyesha Patel RN  Outcome: Ongoing  2/16/2022 0841 by Parisa Smallwood RCP  Outcome: Ongoing  2/15/2022 2201 by Yamile De La Cruz RCP  Outcome: Ongoing  2/15/2022 1944 by Chela Monreal RN  Outcome: Ongoing  Goal: Ability to express needs and understand communication  2/16/2022 0904 by Allison Perez RN  Outcome: Ongoing  2/16/2022 0841 by Bandar Tubbs RCP  Outcome: Ongoing  2/15/2022 2201 by Supa Quintero RCP  Outcome: Ongoing  2/15/2022 1944 by Chela Monreal RN  Outcome: Ongoing  Goal: Mobility/activity is maintained at optimum level for patient  2/16/2022 0904 by Allison Perez RN  Outcome: Ongoing  2/16/2022 0841 by Bandar Tubbs RCP  Outcome: Ongoing  2/15/2022 2201 by Supa Quintero RCP  Outcome: Ongoing  2/15/2022 1944 by Chela Monreal RN  Outcome: Ongoing     Problem: SKIN INTEGRITY  Goal: Skin integrity is maintained or improved  2/16/2022 0904 by Allison Perez RN  Outcome: Ongoing  2/16/2022 0841 by Bandar Tubbs RCP  Outcome: Ongoing  2/15/2022 2201 by Supa Quintero RCP  Outcome: Ongoing  2/15/2022 1944 by Chela Monreal RN  Outcome: Ongoing     Problem: Nutrition  Goal: Optimal nutrition therapy  2/16/2022 0904 by Allison Perez RN  Outcome: Ongoing  2/15/2022 1944 by Chela Monreal RN  Outcome: Ongoing     Problem: Falls - Risk of:  Goal: Will remain free from falls  Description: Will remain free from falls  2/16/2022 0904 by Allison Perez RN  Outcome: Ongoing  2/15/2022 1944 by Chela Monreal RN  Outcome: Ongoing  Goal: Absence of physical injury  Description: Absence of physical injury  2/16/2022 0904 by Allison Perze RN  Outcome: Ongoing  2/15/2022 1944 by Chela Monreal RN  Outcome: Ongoing     Problem: Skin Integrity:  Goal: Will show no infection signs and symptoms  Description: Will show no infection signs and symptoms  2/16/2022 0904 by Allison Perez RN  Outcome: Ongoing  2/15/2022 1944 by Chela Monreal RN  Outcome: Ongoing  Goal: Absence of new skin breakdown  Description: Absence of new skin breakdown  2/16/2022 0904 by Allison Perez RN  Outcome: Ongoing  2/15/2022 1944 by Swathi Boucher Jeevan Toledo RN  Outcome: Ongoing

## 2022-02-16 NOTE — CARE COORDINATION
-Pt currently intubated, plan to attempt to extubate today. [facility] suffering [facility] cardiac arrest from ingesting cocaine. Unable to reach parents at this time. Carlos Solders, [address]   Will attempt assessment with pt once extubated

## 2022-02-16 NOTE — PLAN OF CARE
Problem: OXYGENATION/RESPIRATORY FUNCTION  Goal: Patient will maintain patent airway  2/15/2022 2201 by Key Brannon RCP  Outcome: Ongoing     Problem: OXYGENATION/RESPIRATORY FUNCTION  Goal: Patient will achieve/maintain normal respiratory rate/effort  Description: Respiratory rate and effort will be within normal limits for the patient  2/15/2022 2201 by Key Brannon RCP  Outcome: Ongoing     Problem: MECHANICAL VENTILATION  Goal: Patient will maintain patent airway  2/15/2022 2201 by Key Brannon RCP  Outcome: Ongoing     Problem: MECHANICAL VENTILATION  Goal: Oral health is maintained or improved  2/15/2022 2201 by eKy Brannon RCP  Outcome: Ongoing     Problem: MECHANICAL VENTILATION  Goal: ET tube will be managed safely  2/15/2022 2201 by Key Brannon RCP  Outcome: Ongoing     Problem: MECHANICAL VENTILATION  Goal: Ability to express needs and understand communication  2/15/2022 2201 by Key Brannon RCP  Outcome: Ongoing     Problem: MECHANICAL VENTILATION  Goal: Mobility/activity is maintained at optimum level for patient  2/15/2022 2201 by Key Brannon RCP  Outcome: Ongoing     Problem: SKIN INTEGRITY  Goal: Skin integrity is maintained or improved  2/15/2022 2201 by Key Brannon RCP  Outcome: Ongoing

## 2022-02-16 NOTE — CARE COORDINATION
Case Management Initial Discharge Plan  Wade Powers,             Met with:parent at bedside to discuss discharge plans. Information verified: address, contacts, phone number, , insurance No, parent not aware of address or phone number  Insurance Provider: unknown    Emergency Contact/Next of Kin name & number: parent   Who are involved in patient's support system? Parent Marychuy Castillo [phone]    PCP: No primary care provider on file. Date of last visit: unknown      Discharge Planning    Living Arrangements:        Home has 1 stories  0 stairs to climb to get into front door, 0 stairs to climb to reach second floor  Location of bedroom/bathroom in home main level, this is parents home, unknown address for patient, intubated    Patient able to perform ADL's:Independent    Current Services (outpatient & in home) n/a  DME equipment: n/a  DME provider: n/a    Is patient receiving oral anticoagulation therapy? No        Potential Assistance Needed: Rehab      Patient agreeable to home care: unknown/intubated  Freedom of choice provided:  n/a    Prior SNF/Rehab Placement and Facility: unknown/intubated  Agreeable to SNF/Rehab: unknown  [place] of choice provided: n/a     Evaluation: n/a    Expected Discharge date:       Patient expects to be discharged to:unknown       If home: is the family and/or caregiver wiling & able to provide support at home? spencerk adelita  Who will be providing this support? unknown    Follow Up Appointment: Best Day/ Time:      Transportation provider: unknown  Transportation arrangements needed for discharge: Yes    Readmission Risk              Risk of Unplanned Readmission:  13             Does patient have a readmission risk score greater than 14?: No  If yes, follow-up appointment must be made within 7 days of discharge.      Goals of Care: get glenda vent, safety      Educated patient on transitional options, provided freedom of choice and are agreeable with plan      Discharge Plan: unknown, when patient wakes up, freedom of choice given for possible Rehab          Electronically signed by Dayan Johnson RN on 2/16/22 at 4:11 PM EST

## 2022-02-16 NOTE — PLAN OF CARE
Ongoing  2/15/2022 1402 by Lilliana Tomlin RN  Outcome: Ongoing  2/15/2022 0805 by Abiola Beavers RCP  Outcome: Ongoing  2/15/2022 0619 by Supa Quintero RCP  Outcome: Ongoing  Goal: Ability to express needs and understand communication  2/15/2022 1944 by Chela Monreal RN  Outcome: Ongoing  2/15/2022 1402 by Lilliana Tomlin RN  Outcome: Not Met This Shift  2/15/2022 0805 by Abiola Beavers RCP  Outcome: Ongoing  2/15/2022 0619 by Supa Quintero RCP  Outcome: Ongoing  Goal: Mobility/activity is maintained at optimum level for patient  2/15/2022 1944 by Chela Monreal RN  Outcome: Ongoing  2/15/2022 1402 by Lilliana Tomlin RN  Outcome: Not Met This Shift  2/15/2022 0805 by Aibola Beavers RCP  Outcome: Ongoing  2/15/2022 0619 by Supa Quintero RCP  Outcome: Ongoing     Problem: SKIN INTEGRITY  Goal: Skin integrity is maintained or improved  2/15/2022 1944 by Chela Monreal RN  Outcome: Ongoing  2/15/2022 1402 by Lilliana Tomlin RN  Outcome: Ongoing  2/15/2022 0805 by Abiola Beavers RCP  Outcome: Ongoing  2/15/2022 0619 by Supa Quintero RCP  Outcome: Ongoing     Problem: Nutrition  Goal: Optimal nutrition therapy  2/15/2022 1944 by Chela Monreal RN  Outcome: Ongoing  2/15/2022 1402 by Lilliana Tomlin RN  Outcome: Not Met This Shift  2/15/2022 1348 by Gill Brunson RD, LD  Outcome: Ongoing  Note: Nutrition Problem #1: Inadequate oral intake  Intervention: Food and/or Nutrient Delivery: Start nutrition as able; if TF needed, suggest Standard without Fiber goal 60 mL/hr to provide 1728 kcal and 80 g pro/day.   Nutritional Goals: meet % of estimated nutrient needs       Problem: Falls - Risk of:  Goal: Will remain free from falls  Description: Will remain free from falls  Outcome: Ongoing  Goal: Absence of physical injury  Description: Absence of physical injury  Outcome: Ongoing     Problem: Skin Integrity:  Goal: Will show no infection signs and symptoms  Description: Will show no infection signs and symptoms  Outcome: Ongoing  Goal: Absence of new skin breakdown  Description: Absence of new skin breakdown  Outcome: Ongoing

## 2022-02-16 NOTE — PROGRESS NOTES
pending)    VASOPRESSORS:  [] No    [x] Yes    If yes -   [x] Levophed       [] Dopamine     [] Vasopressin       [] Dobutamine  [] Phenylephrine         [] Epinephrine    CENTRAL LINES:     [] No   [] Yes   (Date of Insertion:   )           If yes -     [] Right IJ     [] Left IJ [x] Right Femoral [] Left Femoral                   [] Right Subclavian [] Left Subclavian       BUCKLEY'S CATHETER:   [x] No   [] Yes  (Date of Insertion:   )     URINE OUTPUT:            [x] Good   [] Low              [] Anuric    REVIEW OF SYSTEMS:  · Unable to be obtained      OBJECTIVE:     VITAL SIGNS:  BP (!) 136/96   Pulse 110   Temp 100.4 °F (38 °C) (Bladder)   Resp 15   Ht 5' 11\" (1.803 m)   Wt 180 lb (81.6 kg)   SpO2 100%   BMI 25.10 kg/m²   Tmax over 24 hours:  Temp (24hrs), Av.8 °F (37.1 °C), Min:96.3 °F (35.7 °C), Max:100.8 °F (38.2 °C)      Patient Vitals for the past 8 hrs:   BP Temp Temp src Pulse Resp SpO2   22 0715    110 15 100 %   22 0700 (!) 136/96   112 16 100 %   22 0645    110 13 100 %   22 0630    110 15 100 %   22 0615    111 14 100 %   22 0600 (!) 138/98 100.4 °F (38 °C) Bladder 114 15 99 %   22 0545    116 16 99 %   22 0530    118 15 99 %   22 0515    119 14 99 %   22 0500 (!) 140/90   118 16 99 %   22 0445    118 15 98 %   22 0430    119 15 100 %   22 0415    117 15 99 %   22 0400 (!) 133/101 100.8 °F (38.2 °C) Bladder 123 19 99 %   22 0345    118 14 100 %   22 0330    117 15 100 %   22 0315 (!) 136/95   118 16 100 %   22 0302    118 18 99 %   22 0300    118 17 99 %   22 0245    124 19 100 %   22 0230    112 15 100 %   22 0215    122 15 100 %   22 0200 132/82   119 17 100 %   22 0145    119 15 100 %   22 0130    119 15 100 %   22 0115    119 16 100 %   22 0100 135/83   118  cisatracurium (NIMBEX) infusion Stopped (02/15/22 1227)    dextrose       PRN Meds:   sodium chloride flush, 5-40 mL, PRN  sodium chloride, 25 mL, PRN  polyethylene glycol, 17 g, Daily PRN  acetaminophen, 650 mg, Q6H PRN   Or  acetaminophen, 650 mg, Q6H PRN  heparin (porcine), 4,000 Units, PRN  heparin (porcine), 2,000 Units, PRN  ondansetron, 4 mg, Q8H PRN   Or  ondansetron, 4 mg, Q6H PRN  glucose, 15 g, PRN  dextrose, 12.5 g, PRN  glucagon (rDNA), 1 mg, PRN  dextrose, 100 mL/hr, PRN        SUPPORT DEVICES: [x] Ventilator [] BIPAP  [] Nasal Cannula [] Room Air    VENT SETTINGS (Comprehensive) (if applicable):  Vent Information  $Ventilation: $Subsequent Day  Skin Assessment: Clean, dry, & intact  Equipment ID: [id number]  Equipment Changed: HME  Vent Type: Servo i  Vent Mode: PRVC  Vt Ordered: 500 mL  Rate Set: 16 bmp  FiO2 : 30 %  SpO2: 100 %  SpO2/FiO2 ratio: 333.33  Sensitivity: 5  PEEP/CPAP: 5  I Time/ I Time %: 0.9 s  Humidification Source: HME  Nitric Oxide/Epoprostenol In Use?: No  Additional Respiratory  Assessments  Pulse: 110  Resp: 15  SpO2: 100 %  End Tidal CO2: 41 (%)  Position: Semi-Stringer's  Humidification Source: HME  Oral Care Completed?: Yes  Oral Care: Teeth brushed,Mouthwash,Mouthwash with chlorhexidine,Mouth swabbed,Mouth suctioned,Suction toothette  Subglottic Suction Done?: Yes  Cuff Pressure (cm H2O):  (MLT)    ABGs:     Lab Results   Component Value Date    VCE4AMJ NOT REPORTED 02/16/2022    FIO2 30.0 02/16/2022     Lactic Acid:   Lab Results   Component Value Date    LACTA NOT REPORTED 02/16/2022    LACTA NOT REPORTED 02/15/2022         DATA:  Complete Blood Count:   Recent Labs     02/15/22  0342 02/15/22  1534 02/16/22  0539   WBC 19.6* 21.0* 16.7*   HGB 12.9 12.4 12.0   MCV 94.9 93.6 92.9    276 261   RBC 4.30 4.07 3.94*   HCT 40.8 38.1 36.6   MCH 30.0 30.5 30.5   MCHC 31.6 32.5 32.8   RDW 13.4 13.5 13.6   MPV 9.4 8.9 9.4        PT/INR:    Lab Results   Component Value Date PROTIME 11.8 02/15/2022    INR 1.1 02/15/2022     PTT:    Lab Results   Component Value Date    APTT 41.1 02/16/2022       Basal Metabolic Profile:   Recent Labs     02/15/22  2139 02/16/22  0321 02/16/22  0539    141 141   K 3.6* 3.6* 3.5*   BUN 16 13 13   CREATININE 1.06* 1.05* 1.05*   * 109* 110*   CO2 20 18* 17*      Magnesium:   Lab Results   Component Value Date    MG 2.1 02/16/2022    MG 2.1 02/16/2022    MG 2.3 02/15/2022     Phosphorus:   Lab Results   Component Value Date    PHOS 3.5 02/16/2022    PHOS 4.1 02/15/2022    PHOS 4.9 02/15/2022     S. Calcium:  Recent Labs     02/16/22  0539   CALCIUM 7.7*     S. Ionized Calcium:No results for input(s): IONCA in the last 72 hours. Urinalysis:   Lab Results   Component Value Date    NITRU NEGATIVE 02/15/2022    COLORU Yellow 02/15/2022    PHUR 6.5 02/15/2022    WBCUA 10 TO 20 02/15/2022    RBCUA 5 TO 10 02/15/2022    MUCUS NOT REPORTED 02/15/2022    TRICHOMONAS NOT REPORTED 02/15/2022    YEAST FEW 02/15/2022    BACTERIA MANY 02/15/2022    SPECGRAV 1.021 02/15/2022    LEUKOCYTESUR TRACE 02/15/2022    UROBILINOGEN Normal 02/15/2022    BILIRUBINUR NEGATIVE 02/15/2022    GLUCOSEU NEGATIVE 02/15/2022    KETUA TRACE 02/15/2022    AMORPHOUS NOT REPORTED 02/15/2022       CARDIAC ENZYMES: No results for input(s): CKMB, CKMBINDEX, TROPONINI in the last 72 hours. Invalid input(s): CKTOTAL;3  BNP: No results for input(s): BNP in the last 72 hours. LFTS  Recent Labs     02/15/22  0342   ALKPHOS 78   *   *   BILITOT <0.10*   LABALBU 4.0       AMYLASE/LIPASE/AMMONIA  No results for input(s): AMYLASE, LIPASE, AMMONIA in the last 72 hours.     Last 3 Blood Glucose:   Recent Labs     02/15/22  0342 02/15/22  1506 02/15/22  2139 02/16/22  0321 02/16/22  0539   GLUCOSE 45* 77 110* 62* 84      HgBA1c:  No results found for: LABA1C      TSH:    Lab Results   Component Value Date    TSH 1.61 10/29/2013     ANEMIA STUDIES  No results for input(s): LABIRON, TIBC, FERRITIN, [id number], FOLATE, OCCULTBLD in the last 72 hours. ASSESSMENT:     Active Problems:    Cardiac arrest [facility])  Resolved Problems:    * No resolved hospital problems. *        PLAN:       Acute respiratory failure and PEA arrest with crack cocaine ingestion  -Continue Versed  -Continue fentanyl  -Levophed as needed for hypotension, goal MAP greater than 65.  -Continue hep ggt for now  -status post cooling.    - awaiting echo     PRATIMA  -Gentle hydration, NS at 100/h  -Monitor for improvement     Leukocytosis  Improving. Cr 1.05.   Good UPO     pepcid bid GI ppx  DVT ppx: already on heparin ggt          Brandin Alberto DO,            Department of Internal Medicine/ Critical care  [address] [facility])             2/16/2022, 7:44 AM

## 2022-02-16 NOTE — PROGRESS NOTES
[facility]   Progress Note                   Date:   2/16/2022  Patient name: Wade Powers  Date of admission:  2/15/2022  2:56 AM  MRN:   [id number]  YOB: 2000  PCP: No primary care provider on file. Reason for Admission:  Cardia arrest    Subjective:       Patient seen and examined bedside in MICU. Labs and chart reviewed. Intubated and sedated with versed and fentanyl. Withdraws to pain in all 4 extremities. Pupils reactive. Hypothermia protocol was discontinued as patient followed verbal commands. Trops 116>98. ECHO pending. Medications:   Scheduled Meds:   sodium chloride flush  5-40 mL IntraVENous 2 times per day    famotidine (PEPCID) injection  20 mg IntraVENous BID    chlorhexidine  15 mL Mouth/Throat BID    aspirin  81 mg Oral Daily       Continuous Infusions:   midazolam 8 mg/hr (02/16/22 0851)    fentaNYL 150 mcg/hr (02/16/22 0722)    sodium chloride      heparin (PORCINE) Infusion 18 Units/kg/hr (02/16/22 0851)    sodium chloride 100 mL/hr at 02/16/22 0851    cisatracurium (NIMBEX) infusion Stopped (02/15/22 1227)    dextrose         CBC:   Recent Labs     02/15/22  0342 02/15/22  1534 02/16/22  0539   WBC 19.6* 21.0* 16.7*   HGB 12.9 12.4 12.0    276 261     BMP:    Recent Labs     02/16/22  0321 02/16/22  0539 02/16/22  0846    141 145*   K 3.6* 3.5* 4.2   * 110* 112*   CO2 18* 17* 18*   BUN 13 13 12   CREATININE 1.05* 1.05* 0.97*   GLUCOSE 62* 84 79     Hepatic:   Recent Labs     02/15/22  0342   *   *   BILITOT <0.10*   ALKPHOS 78     Troponin: No results for input(s): TROPONINI in the last 72 hours. BNP: No results for input(s): BNP in the last 72 hours. Lipids: No results for input(s): CHOL, HDL in the last 72 hours.     Invalid input(s): LDLCALCU  INR:   Recent Labs     02/15/22  0342 02/15/22  1506   INR 1.0 1.1       Objective:   Vitals: /88   Pulse 108   Temp 100 °F (37.8 °C) (Bladder) Resp 15   Ht 5' 11\" (1.803 m)   Wt 180 lb (81.6 kg)   SpO2 100%   BMI 25.10 kg/m²     General appearance: intubated and sedated  HEENT: Head: Normocephalic, no lesions, without obvious abnormality  Neck: no JVD  Lungs: clear to auscultation bilaterally, no basilar rales, no wheezing   Heart: regular rate and rhythm, S1, S2 normal, no murmur, click, rub or gallop  Abdomen: soft, non-tender; bowel sounds normal  Extremities: No LE edema  Neurologic: intubated and sedated      Assessment:     1. PEA/asystole cardiac arrest secondary to cocaine intoxication  2. Elevated troponins likely Type II  3. Acute respiratory failure due to cardiac arrest - on mechanical ventilation  4. Cocaine intoxication  5. PRATIMA      Treatment Plan:     1. 2D ECHO pending. 2. Continue heparin infusion at low dose protocol. Can discontinue heparin if ECHO is shows no wall motion abnormalities. 3. Plan for extubation noted. 4. Avoid beta blockers due to cocaine abuse. 5. Rest of the management per primary. Discussed with patient and nursing. Geeta Sam MD  Formerly named [facility]   [facility]     Attending Cardiologist Addendum: I have reviewed and performed the history, physical, subjective, objective, assessment, and plan with the student/resident/fellow/APN and agree with the note. I performed the history and physical personally. I have made changes to the note above as needed. Still awaiting 2d Echo  Once low risk, stop heparin drip  And outpatient follow up in 2 weeks. Thank you for allowing me to participate in the care of this patient, please do not hesitate to call if you have any questions. John Francis DO, [facility], [address], [address], [place] [address] [facility]  [facility] [employer]  [phone]

## 2022-02-16 NOTE — PLAN OF CARE
Problem: OXYGENATION/RESPIRATORY FUNCTION  Goal: Patient will maintain patent airway  2/16/2022 0841 by Raymond Gutierrez RCP  Outcome: Ongoing  2/15/2022 2201 by Bethene Sicard, RCP  Outcome: Ongoing  2/15/2022 1944 by Ministerio iDa RN  Outcome: Ongoing     Problem: OXYGENATION/RESPIRATORY FUNCTION  Goal: Patient will achieve/maintain normal respiratory rate/effort  Description: Respiratory rate and effort will be within normal limits for the patient  2/16/2022 0841 by Raymond Gutierrez RCP  Outcome: Ongoing  2/15/2022 2201 by Bethene Sicard, RCP  Outcome: Ongoing  2/15/2022 1944 by Ministerio Dia RN  Outcome: Ongoing     Problem: MECHANICAL VENTILATION  Goal: Oral health is maintained or improved  2/16/2022 0841 by Raymond Gutierrez RCP  Outcome: Ongoing  2/15/2022 2201 by Bethene Sicard, RCP  Outcome: Ongoing  2/15/2022 1944 by Ministerio Dia RN  Outcome: Ongoing     Problem: MECHANICAL VENTILATION  Goal: Patient will maintain patent airway  2/16/2022 0841 by NURIS CanelaP  Outcome: Ongoing  2/15/2022 2201 by Bethene Sicard, RCP  Outcome: Ongoing  2/15/2022 1944 by Ministerio Dia RN  Outcome: Ongoing     Problem: MECHANICAL VENTILATION  Goal: ET tube will be managed safely  2/16/2022 0841 by Raymond Gutierrez RCP  Outcome: Ongoing  2/15/2022 2201 by Bethene Sicard, RCP  Outcome: Ongoing  2/15/2022 1944 by Ministerio Dia RN  Outcome: Ongoing     Problem: MECHANICAL VENTILATION  Goal: Ability to express needs and understand communication  2/16/2022 0841 by Raymond Gutierrez RCP  Outcome: Ongoing  2/15/2022 2201 by Bethene Sicard, RCP  Outcome: Ongoing  2/15/2022 1944 by Ministerio Dia RN  Outcome: Ongoing     Problem: MECHANICAL VENTILATION  Goal: Mobility/activity is maintained at optimum level for patient  2/16/2022 0841 by Raymond Gutierrez RCP  Outcome: Ongoing  2/15/2022 2201 by Bethene Sicard, RCP  Outcome: Ongoing  2/15/2022 1944 by Ministerio Dia RN  Outcome: Ongoing     Problem: SKIN INTEGRITY  Goal: Skin integrity is maintained or improved  2/16/2022 0841 by Ravindra Cook RCP  Outcome: Ongoing  2/15/2022 2201 by Miguel Villasenor RCP  Outcome: Ongoing  2/15/2022 1944 by Nancy Garsia RN  Outcome: Ongoing

## 2022-02-17 LAB
ABSOLUTE EOS #: 0.08 K/UL (ref 0–0.44)
ABSOLUTE IMMATURE GRANULOCYTE: 0.03 K/UL (ref 0–0.3)
ABSOLUTE LYMPH #: 2.13 K/UL (ref 1.1–3.7)
ABSOLUTE MONO #: 1.05 K/UL (ref 0.1–1.2)
ALLEN TEST: ABNORMAL
ANION GAP SERPL CALCULATED.3IONS-SCNC: 11 MMOL/L (ref 9–17)
BASOPHILS # BLD: 0 % (ref 0–2)
BASOPHILS ABSOLUTE: 0.05 K/UL (ref 0–0.2)
BUN BLDV-MCNC: 9 MG/DL (ref 6–20)
BUN/CREAT BLD: ABNORMAL (ref 9–20)
CALCIUM SERPL-MCNC: 7.9 MG/DL (ref 8.6–10.4)
CHLORIDE BLD-SCNC: 109 MMOL/L (ref 98–107)
CO2: 17 MMOL/L (ref 20–31)
CREAT SERPL-MCNC: 0.67 MG/DL (ref 0.5–0.9)
DIFFERENTIAL TYPE: ABNORMAL
EOSINOPHILS RELATIVE PERCENT: 1 % (ref 1–4)
FIO2: 30
GFR AFRICAN AMERICAN: >60 ML/MIN
GFR NON-AFRICAN AMERICAN: >60 ML/MIN
GFR SERPL CREATININE-BSD FRML MDRD: ABNORMAL ML/MIN/{1.73_M2}
GFR SERPL CREATININE-BSD FRML MDRD: ABNORMAL ML/MIN/{1.73_M2}
GLUCOSE BLD-MCNC: 207 MG/DL (ref 65–105)
GLUCOSE BLD-MCNC: 85 MG/DL (ref 65–105)
GLUCOSE BLD-MCNC: 93 MG/DL (ref 70–99)
GLUCOSE BLD-MCNC: 96 MG/DL (ref 74–100)
HCT VFR BLD CALC: 34.4 % (ref 36.3–47.1)
HEMOGLOBIN: 11.3 G/DL (ref 11.9–15.1)
IMMATURE GRANULOCYTES: 0 %
LV EF: 60 %
LVEF MODALITY: NORMAL
LYMPHOCYTES # BLD: 17 % (ref 24–43)
MCH RBC QN AUTO: 30.3 PG (ref 25.2–33.5)
MCHC RBC AUTO-ENTMCNC: 32.8 G/DL (ref 28.4–34.8)
MCV RBC AUTO: 92.2 FL (ref 82.6–102.9)
MODE: ABNORMAL
MONOCYTES # BLD: 9 % (ref 3–12)
NEGATIVE BASE EXCESS, ART: 5 (ref 0–2)
NRBC AUTOMATED: 0 PER 100 WBC
O2 DEVICE/FLOW/%: ABNORMAL
PARTIAL THROMBOPLASTIN TIME: 47.8 SEC (ref 20.5–30.5)
PARTIAL THROMBOPLASTIN TIME: 53.9 SEC (ref 20.5–30.5)
PATIENT TEMP: 38
PDW BLD-RTO: 13.4 % (ref 11.8–14.4)
PLATELET # BLD: 223 K/UL (ref 138–453)
PLATELET ESTIMATE: ABNORMAL
PMV BLD AUTO: 9.4 FL (ref 8.1–13.5)
POC HCO3: 19.5 MMOL/L (ref 21–28)
POC O2 SATURATION: 95 % (ref 94–98)
POC PCO2 TEMP: 36 MM HG
POC PCO2: 34.3 MM HG (ref 35–48)
POC PH TEMP: 7.35
POC PH: 7.36 (ref 7.35–7.45)
POC PO2 TEMP: 85 MM HG
POC PO2: 79.4 MM HG (ref 83–108)
POSITIVE BASE EXCESS, ART: ABNORMAL (ref 0–3)
POTASSIUM SERPL-SCNC: 3.9 MMOL/L (ref 3.7–5.3)
RBC # BLD: 3.73 M/UL (ref 3.95–5.11)
RBC # BLD: ABNORMAL 10*6/UL
SAMPLE SITE: ABNORMAL
SEG NEUTROPHILS: 73 % (ref 36–65)
SEGMENTED NEUTROPHILS ABSOLUTE COUNT: 9.08 K/UL (ref 1.5–8.1)
SODIUM BLD-SCNC: 137 MMOL/L (ref 135–144)
TCO2 (CALC), ART: ABNORMAL MMOL/L (ref 22–29)
WBC # BLD: 12.4 K/UL (ref 3.5–11.3)
WBC # BLD: ABNORMAL 10*3/UL

## 2022-02-17 PROCEDURE — 1200000000 HC SEMI PRIVATE

## 2022-02-17 PROCEDURE — 80048 BASIC METABOLIC PNL TOTAL CA: CPT

## 2022-02-17 PROCEDURE — 6370000000 HC RX 637 (ALT 250 FOR IP): Performed by: STUDENT IN AN ORGANIZED HEALTH CARE EDUCATION/TRAINING PROGRAM

## 2022-02-17 PROCEDURE — 2500000003 HC RX 250 WO HCPCS: Performed by: GENERAL PRACTICE

## 2022-02-17 PROCEDURE — 82803 BLOOD GASES ANY COMBINATION: CPT

## 2022-02-17 PROCEDURE — 6360000002 HC RX W HCPCS: Performed by: STUDENT IN AN ORGANIZED HEALTH CARE EDUCATION/TRAINING PROGRAM

## 2022-02-17 PROCEDURE — 85025 COMPLETE CBC W/AUTO DIFF WBC: CPT

## 2022-02-17 PROCEDURE — 6370000000 HC RX 637 (ALT 250 FOR IP): Performed by: GENERAL PRACTICE

## 2022-02-17 PROCEDURE — 6360000002 HC RX W HCPCS: Performed by: GENERAL PRACTICE

## 2022-02-17 PROCEDURE — 2580000003 HC RX 258: Performed by: GENERAL PRACTICE

## 2022-02-17 PROCEDURE — 85730 THROMBOPLASTIN TIME PARTIAL: CPT

## 2022-02-17 PROCEDURE — 37799 UNLISTED PX VASCULAR SURGERY: CPT

## 2022-02-17 PROCEDURE — 93306 TTE W/DOPPLER COMPLETE: CPT

## 2022-02-17 PROCEDURE — 99291 CRITICAL CARE FIRST HOUR: CPT | Performed by: INTERNAL MEDICINE

## 2022-02-17 PROCEDURE — 82947 ASSAY GLUCOSE BLOOD QUANT: CPT

## 2022-02-17 PROCEDURE — 94003 VENT MGMT INPAT SUBQ DAY: CPT

## 2022-02-17 RX ADMIN — ACETAMINOPHEN 650 MG: 325 TABLET ORAL at 19:45

## 2022-02-17 RX ADMIN — SODIUM CHLORIDE, PRESERVATIVE FREE 10 ML: 5 INJECTION INTRAVENOUS at 21:07

## 2022-02-17 RX ADMIN — FAMOTIDINE 20 MG: 10 INJECTION INTRAVENOUS at 07:45

## 2022-02-17 RX ADMIN — ASPIRIN 81 MG: 81 TABLET, CHEWABLE ORAL at 07:45

## 2022-02-17 RX ADMIN — SODIUM CHLORIDE, PRESERVATIVE FREE 10 ML: 5 INJECTION INTRAVENOUS at 07:45

## 2022-02-17 RX ADMIN — SODIUM CHLORIDE 100 ML/HR: 9 INJECTION, SOLUTION INTRAVENOUS at 02:19

## 2022-02-17 RX ADMIN — ACETAMINOPHEN 650 MG: 325 TABLET ORAL at 06:17

## 2022-02-17 RX ADMIN — HEPARIN SODIUM 2000 UNITS: 1000 INJECTION INTRAVENOUS; SUBCUTANEOUS at 01:47

## 2022-02-17 RX ADMIN — FAMOTIDINE 20 MG: 10 INJECTION INTRAVENOUS at 21:07

## 2022-02-17 RX ADMIN — HEPARIN SODIUM 20 UNITS/KG/HR: 5000 INJECTION INTRAVENOUS; SUBCUTANEOUS at 01:46

## 2022-02-17 RX ADMIN — ENOXAPARIN SODIUM 40 MG: 100 INJECTION SUBCUTANEOUS at 16:04

## 2022-02-17 RX ADMIN — ACETAMINOPHEN 650 MG: 325 TABLET ORAL at 00:16

## 2022-02-17 RX ADMIN — Medication 50 MCG/HR: at 03:53

## 2022-02-17 ASSESSMENT — PULMONARY FUNCTION TESTS
PIF_VALUE: 16
PIF_VALUE: 15
PIF_VALUE: 17
PIF_VALUE: 14
PIF_VALUE: 17
PIF_VALUE: 11
PIF_VALUE: 13
PIF_VALUE: 12
PIF_VALUE: 11
PIF_VALUE: 14
PIF_VALUE: 13
PIF_VALUE: 11
PIF_VALUE: 23
PIF_VALUE: 18
PIF_VALUE: 14
PIF_VALUE: 14
PIF_VALUE: 32
PIF_VALUE: 17
PIF_VALUE: 16
PIF_VALUE: 13
PIF_VALUE: 18
PIF_VALUE: 20
PIF_VALUE: 16
PIF_VALUE: 12
PIF_VALUE: 16
PIF_VALUE: 14
PIF_VALUE: 13
PIF_VALUE: 14
PIF_VALUE: 14
PIF_VALUE: 13

## 2022-02-17 ASSESSMENT — PAIN SCALES - GENERAL
PAINLEVEL_OUTOF10: 0

## 2022-02-17 NOTE — PLAN OF CARE
Problem: Non-Violent Restraints  Goal: No harm/injury to patient while restraints in use  2/16/2022 1939 by Ron Mcintosh RN  Outcome: Met This Shift  2/16/2022 0904 by Gunner Damian RN  Outcome: Met This Shift  Goal: Patient's dignity will be maintained  2/16/2022 1939 by Ron Mcintosh RN  Outcome: Met This Shift  2/16/2022 0904 by Gunner Damian RN  Outcome: Met This Shift     Problem: Non-Violent Restraints  Goal: Removal from restraints as soon as assessed to be safe  2/16/2022 1939 by Ron Mcintosh RN  Outcome: Ongoing  2/16/2022 0904 by Gunner Damian RN  Outcome: Not Met This Shift     Problem: OXYGENATION/RESPIRATORY FUNCTION  Goal: Patient will maintain patent airway  2/16/2022 1939 by Ron Mcintosh RN  Outcome: Ongoing  2/16/2022 0904 by Gunner Damian RN  Outcome: Ongoing  2/16/2022 0841 by Lester Richard RCP  Outcome: Ongoing  Goal: Patient will achieve/maintain normal respiratory rate/effort  Description: Respiratory rate and effort will be within normal limits for the patient  2/16/2022 1939 by Ron Mcintosh RN  Outcome: Ongoing  2/16/2022 0904 by Gunner Damian RN  Outcome: Ongoing  2/16/2022 0841 by Lester Richard RCP  Outcome: Ongoing     Problem: MECHANICAL VENTILATION  Goal: Patient will maintain patent airway  2/16/2022 1939 by Ron Mcintosh RN  Outcome: Ongoing  2/16/2022 0904 by Gunner Damian RN  Outcome: Ongoing  2/16/2022 0841 by Lester Richard RCP  Outcome: Ongoing  Goal: Oral health is maintained or improved  2/16/2022 1939 by Ron Mcintosh RN  Outcome: Ongoing  2/16/2022 0904 by Gunner Damian RN  Outcome: Ongoing  2/16/2022 0841 by Lester Richard RCP  Outcome: Ongoing  Goal: ET tube will be managed safely  2/16/2022 1939 by Ron Mcintosh RN  Outcome: Ongoing  2/16/2022 0904 by Gunner Damian RN  Outcome: Ongoing  2/16/2022 0841 by Lester Richard RCP  Outcome: Ongoing  Goal: Ability to express needs and understand communication  2/16/2022 1939 by Dre Thomas RN  Outcome: Ongoing  2/16/2022 0904 by Lara Mcnair RN  Outcome: Ongoing  2/16/2022 0841 by Dixie Wood RCP  Outcome: Ongoing  Goal: Mobility/activity is maintained at optimum level for patient  2/16/2022 1939 by Dre Thomas RN  Outcome: Ongoing  2/16/2022 0904 by Lara Mcnair RN  Outcome: Ongoing  2/16/2022 0841 by Dixie Wood RCP  Outcome: Ongoing     Problem: SKIN INTEGRITY  Goal: Skin integrity is maintained or improved  2/16/2022 1939 by Dre Thomas RN  Outcome: Ongoing  2/16/2022 0904 by Lara Mcnair RN  Outcome: Ongoing  2/16/2022 0841 by Dixie Wood RCP  Outcome: Ongoing     Problem: Nutrition  Goal: Optimal nutrition therapy  2/16/2022 1939 by Dre Thomas RN  Outcome: Ongoing  2/16/2022 0904 by Lara Mcnair RN  Outcome: Ongoing     Problem: Falls - Risk of:  Goal: Will remain free from falls  Description: Will remain free from falls  2/16/2022 1939 by Dre Thomas RN  Outcome: Ongoing  2/16/2022 0904 by Lara Mcnair RN  Outcome: Ongoing  Goal: Absence of physical injury  Description: Absence of physical injury  2/16/2022 1939 by Dre Thomas RN  Outcome: Ongoing  2/16/2022 0904 by Lara Mcnair RN  Outcome: Ongoing     Problem: Skin Integrity:  Goal: Will show no infection signs and symptoms  Description: Will show no infection signs and symptoms  2/16/2022 1939 by Dre Thomas RN  Outcome: Ongoing  2/16/2022 0904 by Lara Mcnair RN  Outcome: Ongoing  Goal: Absence of new skin breakdown  Description: Absence of new skin breakdown  2/16/2022 1939 by Dre Thomas RN  Outcome: Ongoing  2/16/2022 0904 by Lara Mcnair RN  Outcome: Ongoing

## 2022-02-17 NOTE — PLAN OF CARE
Problem: OXYGENATION/RESPIRATORY FUNCTION  Goal: Patient will maintain patent airway  2/16/2022 2136 by Yamile De La Cruz RCP  Outcome: Ongoing  2/16/2022 1939 by Nancy Child RN  Outcome: Ongoing  Goal: Patient will achieve/maintain normal respiratory rate/effort  Description: Respiratory rate and effort will be within normal limits for the patient  2/16/2022 2136 by Yamile De La Cruz RCP  Outcome: Ongoing  2/16/2022 1939 by Nancy Child RN  Outcome: Ongoing     Problem: MECHANICAL VENTILATION  Goal: Patient will maintain patent airway  2/16/2022 2136 by Yamile De La Cruz RCP  Outcome: Ongoing  2/16/2022 1939 by Nancy Child RN  Outcome: Ongoing  Goal: Oral health is maintained or improved  2/16/2022 2136 by Yamile De La Cruz RCP  Outcome: Ongoing  2/16/2022 1939 by Nancy Child RN  Outcome: Ongoing  Goal: ET tube will be managed safely  2/16/2022 2136 by Yamile De La Cruz RCP  Outcome: Ongoing  2/16/2022 1939 by Nancy Child RN  Outcome: Ongoing  Goal: Ability to express needs and understand communication  2/16/2022 2136 by Yamile De La Cruz RCP  Outcome: Ongoing  2/16/2022 1939 by Nancy Child RN  Outcome: Ongoing  Goal: Mobility/activity is maintained at optimum level for patient  2/16/2022 2136 by Yamile De La Cruz RCP  Outcome: Ongoing  2/16/2022 1939 by Nancy Child RN  Outcome: Ongoing     Problem: SKIN INTEGRITY  Goal: Skin integrity is maintained or improved  2/16/2022 2136 by Yamile De La Cruz RCP  Outcome: Ongoing  2/16/2022 1939 by Nancy Child RN  Outcome: Ongoing     Problem: Nutrition  Goal: Optimal nutrition therapy  2/16/2022 1939 by Nancy Child RN  Outcome: Ongoing     Problem: Falls - Risk of:  Goal: Will remain free from falls  Description: Will remain free from falls  2/16/2022 1939 by Nancy Child RN  Outcome: Ongoing  Goal: Absence of physical injury  Description: Absence of physical injury  2/16/2022 1939 by Nancy Child RN  Outcome: Ongoing     Problem: Skin Integrity:  Goal: Will show no infection signs and symptoms  Description: Will show no infection signs and symptoms  2/16/2022 1939 by Jessie Antonio RN  Outcome: Ongoing  Goal: Absence of new skin breakdown  Description: Absence of new skin breakdown  2/16/2022 1939 by Jessie Antonio RN  Outcome: Ongoing

## 2022-02-17 NOTE — PROGRESS NOTES
[facility]   Progress Note                   Date:   2/17/2022  Patient name: Huy Hernandez  Date of admission:  2/15/2022  2:56 AM  MRN:   [id number]  YOB: 2000  PCP: No primary care provider on file. Reason for Admission:  Cardia arrest    Subjective:       Patient seen and examined bedside in MICU. Labs and chart reviewed. No acute overnight events. Patient extubated this morning. Tolerated well. Currently maintaining O2 saturations on nasal cannula O2. Sleepy but arousable. Oriented x3. Currently weaning on CPAP mode. On slight sedation with fentanyl 50 mcg/min. Hemodynamically stable. Off pressors. 2D ECHO still pending. Medications:   Scheduled Meds:   sodium chloride flush  5-40 mL IntraVENous 2 times per day    famotidine (PEPCID) injection  20 mg IntraVENous BID    chlorhexidine  15 mL Mouth/Throat BID    aspirin  81 mg Oral Daily       Continuous Infusions:   midazolam Stopped (02/17/22 0549)    fentaNYL 50 mcg/hr (02/17/22 0353)    sodium chloride      heparin (PORCINE) Infusion 22 Units/kg/hr (02/17/22 0750)    sodium chloride 100 mL/hr at 02/17/22 0750    cisatracurium (NIMBEX) infusion Stopped (02/15/22 1227)    dextrose         CBC:   Recent Labs     02/15/22  1534 02/16/22  0539 02/17/22  0552   WBC 21.0* 16.7* 12.4*   HGB 12.4 12.0 11.3*    261 223     BMP:    Recent Labs     02/16/22  0539 02/16/22  0846 02/17/22  0552    145* 137   K 3.5* 4.2 3.9   * 112* 109*   CO2 17* 18* 17*   BUN 13 12 9   CREATININE 1.05* 0.97* 0.67   GLUCOSE 84 79 93     Hepatic:   Recent Labs     02/15/22  0342 02/16/22  0846   * 194*   * 280*   BILITOT <0.10* 0.23*   ALKPHOS 78 58     Troponin: No results for input(s): TROPONINI in the last 72 hours. BNP: No results for input(s): BNP in the last 72 hours. Lipids: No results for input(s): CHOL, HDL in the last 72 hours.     Invalid input(s): LDLCALCU  INR:   Recent Labs 02/15/22  0342 02/15/22  1506   INR 1.0 1.1       Objective:   Vitals: /88   Pulse 101   Temp 100.8 °F (38.2 °C) (Bladder)   Resp 18   Ht 5' 11\" (1.803 m)   Wt 180 lb (81.6 kg)   SpO2 98%   BMI 25.10 kg/m²     General appearance: intubated and sedated  HEENT: Head: Normocephalic, no lesions, without obvious abnormality  Neck: no JVD  Lungs: clear to auscultation bilaterally, no basilar rales, no wheezing   Heart: regular rate and rhythm, S1, S2 normal, no murmur, click, rub or gallop  Abdomen: soft, non-tender; bowel sounds normal  Extremities: No LE edema  Neurologic: intubated and sedated      Assessment:     1. PEA/asystole cardiac arrest secondary to cocaine intoxication  2. Elevated troponins likely Type II  3. Acute respiratory failure due to cardiac arrest - on mechanical ventilation  4. Cocaine intoxication  5. PRATIMA      Treatment Plan:     1. 2D ECHO still pending. 2. Continue heparin infusion at low dose protocol. Discontinue heparin if ECHO is low risk. 3. Avoid beta blockers due to cocaine abuse. 4. Rest of the management per primary. 5. Cardiology will follow from periphery. Do not hesitate to call us if any questions. Discussed with patient and nursing. Clyde Powers MD  [id number] [facility]   [facility]     I performed a history and physical examination of the patient and discussed management with the resident. I reviewed the residents note and agree with the documented findings and plan of care. Any areas of disagreement are noted on the chart. I was personally present for the key portions of any procedures. I have documented in the chart those procedures where I was not present during the key portions. I have personally evaluated this patient and have completed at least one if not all key elements of the E/M (history, physical exam, and MDM). Additional findings are as noted.     PEA arrest due to cocaine intoxication. Mild HS trop elevation due to cocaine intoxication. Now extubated. DC IV heparin. TTE pending.     Alfonso De Oliveira MD

## 2022-02-17 NOTE — PROGRESS NOTES
Writer attempted to call mother to have her help fill out MRI checklist. Mother was not able to be reached by phone and her mailbox was full so a message could not be left. Writer will continue to try calling mom.

## 2022-02-17 NOTE — PROGRESS NOTES
Physician Progress Note      PATIENT:               Liborio Short  CSN #:                  [id number]  :                       2000  ADMIT DATE:       2/15/2022 2:56 AM  DISCH DATE:  RESPONDING  PROVIDER #:        Elma Foy          QUERY TEXT:    Pt admitted with Drug overdose s/p Cardiac Arrest requiring pressor   support. Please document in the progress notes if you are evaluating and/or   treating any of the following: The medical record reflects the following:  Risk Factors: Overdose, PEA arrest  Clinical Indicators: OD Crack cocaine. Seizure then PEA arrest on scene   requiring several rounds of Epi Intubated. Hypotensive requiring vasopressor   support on arrival.Type 2 MI. Leukocytosis, PRATIMA, UTI. Treatment: EPI PTA, Levophed,Mechanical Vent with ICU monitoring, IVF,Hep gtt,   Hypothermia protocol  Options provided:  -- Cardiogenic Shock  -- Traumatic Shock  -- Hypovolemic Shock  -- Hypovolemia without Shock  -- Hypotension without Shock  -- Other - I will add my own diagnosis  -- Disagree - Not applicable / Not valid  -- Disagree - Clinically unable to determine / Unknown  -- Refer to Clinical Documentation Reviewer    PROVIDER RESPONSE TEXT:    Provider is clinically unable to determine a response to this query.     Query created by: Shalini Ward on 2022 8:25 AM      Electronically signed by:  Elma Foy 2022 1:40 PM

## 2022-02-17 NOTE — PROGRESS NOTES
INTENSIVE CARE UNIT  Resident Physician Progress Note    Patient - Jovita Caballero  Date of Admission -  2/15/2022  2:56 AM  Date of Evaluation -  2/17/2022  Room and Bed Number -  [facility] Day - 2    SUBJECTIVE:     HISTORY OF PRESENT ILLNESS:    25-year-old female, brought in by EMS from correctional facility after swallowing crack cocaine.  Patient had a seizure, then experienced a PEA arrest requiring several rounds of epinephrine.  ROSC was achieved prior to arrival. Dottie Hare is hypotensive requiring vasopressor support on arrival.  Intubated and sedated, status post cooling. Per report, patient made suicidal statements prior to swallowing drugs. Currently in suicide precautions.     OVERNIGHT EVENTS:      Extubated this morning to nasal cannula  Off sedation  Cardiology following due to increased troponins  Currently on heparin drip, awaiting echo, if echo is low risk planning on stopping heparin drip  And suicide precautions, sitter at bedside  Plan for psych eval once patient more awake      OBJECTIVE:     VITAL SIGNS:  Patient Vitals for the past 8 hrs:   BP Temp Temp src Pulse Resp SpO2   02/17/22 0800  100.4 °F (38 °C) Bladder      02/17/22 0750    104 12 100 %   02/17/22 0700 139/88   101 18 98 %   02/17/22 0645    110 29 98 %   02/17/22 0630    100 19 99 %   02/17/22 0615    100 19 99 %   02/17/22 0600 (!) 126/93 100.8 °F (38.2 °C) Bladder 97 18 99 %   02/17/22 0553      100 %   02/17/22 0545    107 20 100 %   02/17/22 0530    92 22 100 %   02/17/22 0515    95 20 99 %   02/17/22 0500 (!) 136/93   95 20 99 %   02/17/22 0445    95 19 100 %   02/17/22 0430    94 19 99 %   02/17/22 0415    103 21 96 %   02/17/22 0400 (!) 141/88 100.4 °F (38 °C) Bladder 94 19 98 %   02/17/22 0345    95 20 99 %   02/17/22 0330    93 21 99 %   02/17/22 0315    105 21 98 %   02/17/22 0307    114 25 98 %   02/17/22 0215    102 20 98 %   02/17/22 0200 128/78 100.6 °F (38.1 °C) Bladder 102 22 99 %   22 0145    97 16 99 %   22 0130    106 19 98 %       Last Body weight:   Wt Readings from Last 3 Encounters:   02/15/22 180 lb (81.6 kg)       Body Mass Index : Body mass index is 25.1 kg/m². Tmax over 24 hours: Temp (24hrs), Av.5 °F (38.1 °C), Min:99.7 °F (37.6 °C), Max:101.3 °F (38.5 °C)      Ins/Outs: In: 3690.3 [I.V.:3155.3; NG/GT:422]  Out: 7279 [Urine:2420]    PHYSICAL EXAM:  Constitutional: Appears in no acute distress, awake, follows commands  EENT: PERRLA, EOMI, sclera clear, anicteric, oropharynx clear, no lesions, neck supple with midline trachea. Neck: Supple, symmetrical, trachea midline, no adenopathy, thyroid symmetric, no jvd skin normal  Respiratory: clear to auscultation, no wheezes or rales and unlabored breathing.  No intercostal tenderness, on nasal cannula  Cardiovascular: regular rate and rhythm, normal S1, S2, no murmur noted and 2+ pulses throughout  Abdomen: soft, nontender, nondistended, no masses or organomegaly  Extremities:  peripheral pulses normal, no pedal edema, no clubbing or cyanosis    MEDICATIONS:  Scheduled Meds:   sodium chloride flush  5-40 mL IntraVENous 2 times per day    famotidine (PEPCID) injection  20 mg IntraVENous BID    chlorhexidine  15 mL Mouth/Throat BID    aspirin  81 mg Oral Daily       Continuous Infusions:   midazolam Stopped (22 0549)    fentaNYL 50 mcg/hr (22 0353)    sodium chloride      heparin (PORCINE) Infusion 22 Units/kg/hr (22 08)    sodium chloride 100 mL/hr at 22 08    cisatracurium (NIMBEX) infusion Stopped (02/15/22 1227)    dextrose         PRN Meds:   sodium chloride flush, 5-40 mL, PRN  sodium chloride, 25 mL, PRN  polyethylene glycol, 17 g, Daily PRN  acetaminophen, 650 mg, Q6H PRN   Or  acetaminophen, 650 mg, Q6H PRN  heparin (porcine), 4,000 Units, PRN  heparin (porcine), 2,000 Units, PRN  ondansetron, 4 mg, Q8H PRN Or  ondansetron, 4 mg, Q6H PRN  glucose, 15 g, PRN  dextrose, 12.5 g, PRN  glucagon (rDNA), 1 mg, PRN  dextrose, 100 mL/hr, PRN      SUPPORT DEVICES: [] Ventilator [] BIPAP  [x] Nasal Cannula [] Room Air    DATA:  Complete Blood Count:   Recent Labs     02/15/22  1534 02/16/22  0539 02/17/22  0552   WBC 21.0* 16.7* 12.4*   RBC 4.07 3.94* 3.73*   HGB 12.4 12.0 11.3*   HCT 38.1 36.6 34.4*   MCV 93.6 92.9 92.2   MCH 30.5 30.5 30.3   MCHC 32.5 32.8 32.8   RDW 13.5 13.6 13.4    261 223   MPV 8.9 9.4 9.4        Last 3 Blood Glucose:   Recent Labs     02/15/22  0342 02/15/22  1506 02/15/22  2139 02/16/22  0321 02/16/22  0539 02/16/22  0846 02/17/22  0552   GLUCOSE 45* 77 110* 62* 84 79 93        PT/INR:    Lab Results   Component Value Date    PROTIME 11.8 02/15/2022    INR 1.1 02/15/2022     PTT:    Lab Results   Component Value Date    APTT 53.9 02/17/2022       Basic Metabolic Profile:   Recent Labs     02/15/22  2139 02/15/22  2139 02/16/22  0321 02/16/22  0321 02/16/22  0539 02/16/22  0846 02/17/22  0552      < > 141   < > 141 145* 137   K 3.6*   < > 3.6*   < > 3.5* 4.2 3.9   *   < > 109*   < > 110* 112* 109*   CO2 20   < > 18*   < > 17* 18* 17*   BUN 16   < > 13   < > 13 12 9   CREATININE 1.06*   < > 1.05*   < > 1.05* 0.97* 0.67   GLUCOSE 110*   < > 62*   < > 84 79 93   PHOS 4.1  --  3.5  --   --  2.8  --     < > = values in this interval not displayed.        Liver Function:  Recent Labs     02/16/22  0846   PROT 6.0*   LABALBU 3.4*   *   *   ALKPHOS 58   BILITOT 0.23*       Magnesium:   Lab Results   Component Value Date    MG 2.0 02/16/2022    MG 2.1 02/16/2022    MG 2.1 02/16/2022     Phosphorus:   Lab Results   Component Value Date    PHOS 2.8 02/16/2022    PHOS 3.5 02/16/2022    PHOS 4.1 02/15/2022     Ionized Calcium:   Lab Results   Component Value Date    CAION 1.08 02/16/2022    CAION 1.11 02/16/2022    CAION 1.12 02/15/2022        Urinalysis:   Lab Results   Component Value Date    NITRU NEGATIVE 02/15/2022    COLORU Yellow 02/15/2022    PHUR 6.5 02/15/2022    WBCUA 10 TO 20 02/15/2022    RBCUA 5 TO 10 02/15/2022    MUCUS NOT REPORTED 02/15/2022    TRICHOMONAS NOT REPORTED 02/15/2022    YEAST FEW 02/15/2022    BACTERIA MANY 02/15/2022    SPECGRAV 1.021 02/15/2022    LEUKOCYTESUR TRACE 02/15/2022    UROBILINOGEN Normal 02/15/2022    BILIRUBINUR NEGATIVE 02/15/2022    GLUCOSEU NEGATIVE 02/15/2022    KETUA TRACE 02/15/2022    AMORPHOUS NOT REPORTED 02/15/2022     TSH:    Lab Results   Component Value Date    TSH 1.61 10/29/2013     Other Labs:  Results for orders placed or performed during the hospital encounter of 02/15/22   COVID-19, Rapid    Specimen: Nasopharyngeal Swab   Result Value Ref Range    Specimen Description . NASOPHARYNGEAL SWAB     SARS-CoV-2, Rapid Not Detected Not Detected   CBC Auto Differential   Result Value Ref Range    WBC 19.6 (H) 3.5 - 11.3 k/uL    RBC 4.30 3.95 - 5.11 m/uL    Hemoglobin 12.9 11.9 - 15.1 g/dL    Hematocrit 40.8 36.3 - 47.1 %    MCV 94.9 82.6 - 102.9 fL    MCH 30.0 25.2 - 33.5 pg    MCHC 31.6 28.4 - 34.8 g/dL    RDW 13.4 11.8 - 14.4 %    Platelets 633 034 - 528 k/uL    MPV 9.4 8.1 - 13.5 fL    NRBC Automated 0.0 0.0 per 100 WBC    Differential Type NOT REPORTED     Seg Neutrophils 83 (H) 36 - 65 %    Lymphocytes 9 (L) 24 - 43 %    Monocytes 6 3 - 12 %    Eosinophils % 0 (L) 1 - 4 %    Basophils 0 0 - 2 %    Immature Granulocytes 2 (H) 0 %    Segs Absolute 16.23 (H) 1.50 - 8.10 k/uL    Absolute Lymph # 1.74 1.10 - 3.70 k/uL    Absolute Mono # 1.11 0.10 - 1.20 k/uL    Absolute Eos # 0.05 0.00 - 0.44 k/uL    Basophils Absolute 0.05 0.00 - 0.20 k/uL    Absolute Immature Granulocyte 0.39 (H) 0.00 - 0.30 k/uL    WBC Morphology NOT REPORTED     RBC Morphology NOT REPORTED     Platelet Estimate NOT REPORTED    Comprehensive Metabolic Panel w/ Reflex to MG   Result Value Ref Range    Glucose 45 (L) 70 - 99 mg/dL    BUN 13 6 - 20 mg/dL    CREATININE 1.31 (H) 0.50 - 0.90 mg/dL    Bun/Cre Ratio NOT REPORTED 9 - 20    Calcium 8.4 (L) 8.6 - 10.4 mg/dL    Sodium 137 135 - 144 mmol/L    Potassium 4.0 3.7 - 5.3 mmol/L    Chloride 108 (H) 98 - 107 mmol/L    CO2 15 (L) 20 - 31 mmol/L    Anion Gap 14 9 - 17 mmol/L    Alkaline Phosphatase 78 35 - 104 U/L     (H) 5 - 33 U/L     (H) <32 U/L    Total Bilirubin <0.10 (L) 0.3 - 1.2 mg/dL    Total Protein 6.7 6.4 - 8.3 g/dL    Albumin 4.0 3.5 - 5.2 g/dL    Albumin/Globulin Ratio 1.5 1.0 - 2.5    GFR Non- 50 (L) >60 mL/min    GFR African American >60 >60 mL/min    GFR Comment          GFR Staging NOT REPORTED    Troponin   Result Value Ref Range    Troponin, High Sensitivity 116 (HH) 0 - 14 ng/L    Troponin T NOT REPORTED <0.03 ng/mL    Troponin Interp NOT REPORTED    Brain Natriuretic Peptide   Result Value Ref Range    Pro- <300 pg/mL    BNP Interpretation NOT REPORTED    Protime-INR   Result Value Ref Range    Protime 10.8 9.1 - 12.3 sec    INR 1.0    APTT   Result Value Ref Range    PTT 21.6 20.5 - 30.5 sec   TOX SCR, BLD, ED   Result Value Ref Range    Acetaminophen Level <5 (L) 10 - 30 ug/mL    Ethanol <10 <10 mg/dL    Ethanol percent <9.120 <5.816 %    Salicylate Lvl <1 (L) 3 - 10 mg/dL    Toxic Tricyclic Sc,Blood NEGATIVE NEGATIVE   Urine Drug Screen   Result Value Ref Range    Amphetamine Screen, Ur NEGATIVE NEGATIVE    Barbiturate Screen, Ur NEGATIVE NEGATIVE    Benzodiazepine Screen, Urine NEGATIVE NEGATIVE    Cocaine Metabolite, Urine NEGATIVE NEGATIVE    Methadone Screen, Urine NEGATIVE NEGATIVE    Opiates, Urine NEGATIVE NEGATIVE    Phencyclidine, Urine NEGATIVE NEGATIVE    Propoxyphene, Urine NOT REPORTED NEGATIVE    Cannabinoid Scrn, Ur NEGATIVE NEGATIVE    Oxycodone Screen, Ur NEGATIVE NEGATIVE    Methamphetamine, Urine NOT REPORTED NEGATIVE    Tricyclic Antidepressants, Urine NOT REPORTED NEGATIVE    MDMA, Urine NOT REPORTED NEGATIVE    Buprenorphine Urine NOT REPORTED NEGATIVE Test Information       Assay provides medical screening only. The absence of expected drug(s) and/or metabolite(s) may indicate diluted or adulterated urine, limitations of testing or timing of collection. HCG Qualitative, Serum   Result Value Ref Range    hCG Qual NEGATIVE NEGATIVE   URINALYSIS   Result Value Ref Range    Color, UA Yellow Yellow    Turbidity UA Cloudy (A) Clear    Glucose, Ur NEGATIVE NEGATIVE    Bilirubin Urine NEGATIVE NEGATIVE    Ketones, Urine TRACE (A) NEGATIVE    Specific Gravity, UA 1.021 1.005 - 1.030    Urine Hgb LARGE (A) NEGATIVE    pH, UA 6.5 5.0 - 8.0    Protein, UA 2+ (A) NEGATIVE    Urobilinogen, Urine Normal Normal    Nitrite, Urine NEGATIVE NEGATIVE    Leukocyte Esterase, Urine TRACE (A) NEGATIVE    Urinalysis Comments NOT REPORTED    Microscopic Urinalysis   Result Value Ref Range    -          WBC, UA 10 TO 20 0 - 5 /HPF    RBC, UA 5 TO 10 0 - 2 /HPF    Casts UA FINE GRANULAR 0 - 2 /LPF    Casts UA 2 TO 5 0 - 2 /LPF    Casts UA 5 TO 10 0 - 2 /LPF    Casts UA HYALINE 0 - 2 /LPF    Crystals, UA NOT REPORTED None /HPF    Epithelial Cells UA 20 TO 50 0 - 5 /HPF    Renal Epithelial, UA NOT REPORTED 0 /HPF    Bacteria, UA MANY (A) None    Mucus, UA NOT REPORTED None    Trichomonas, UA NOT REPORTED None    Amorphous, UA NOT REPORTED None    Other Observations UA NOT REPORTED NOT REQ.     Yeast, UA FEW (A) None   Basic Metabolic Panel w/ Reflex to MG   Result Value Ref Range    Glucose 77 70 - 99 mg/dL    BUN 18 6 - 20 mg/dL    CREATININE 1.18 (H) 0.50 - 0.90 mg/dL    Bun/Cre Ratio NOT REPORTED 9 - 20    Calcium 7.6 (L) 8.6 - 10.4 mg/dL    Sodium 143 135 - 144 mmol/L    Potassium 3.6 (L) 3.7 - 5.3 mmol/L    Chloride 109 (H) 98 - 107 mmol/L    CO2 18 (L) 20 - 31 mmol/L    Anion Gap 16 9 - 17 mmol/L    GFR Non-African American 57 (L) >60 mL/min    GFR African American >60 >60 mL/min    GFR Comment          GFR Staging NOT REPORTED    Calcium, Ionized   Result Value Ref Range    Calcium, Ion 1.06 (L) 1.13 - 1.33 mmol/L   Lactic Acid, Whole Blood   Result Value Ref Range    Lactic Acid, Whole Blood 0.6 (L) 0.7 - 2.1 mmol/L   Magnesium   Result Value Ref Range    Magnesium 2.6 1.6 - 2.6 mg/dL   Phosphorus   Result Value Ref Range    Phosphorus 4.9 (H) 2.6 - 4.5 mg/dL   Protime-INR   Result Value Ref Range    Protime 11.8 9.1 - 12.3 sec    INR 1.1    APTT   Result Value Ref Range    PTT 35.0 (H) 20.5 - 30.5 sec   CBC Auto Differential   Result Value Ref Range    WBC 21.0 (H) 3.5 - 11.3 k/uL    RBC 4.07 3.95 - 5.11 m/uL    Hemoglobin 12.4 11.9 - 15.1 g/dL    Hematocrit 38.1 36.3 - 47.1 %    MCV 93.6 82.6 - 102.9 fL    MCH 30.5 25.2 - 33.5 pg    MCHC 32.5 28.4 - 34.8 g/dL    RDW 13.5 11.8 - 14.4 %    Platelets 210 362 - 685 k/uL    MPV 8.9 8.1 - 13.5 fL    NRBC Automated 0.0 0.0 per 100 WBC    Differential Type NOT REPORTED     Seg Neutrophils 91 (H) 36 - 65 %    Lymphocytes 5 (L) 24 - 43 %    Monocytes 4 3 - 12 %    Eosinophils % 0 (L) 1 - 4 %    Basophils 0 0 - 2 %    Immature Granulocytes 0 0 %    Segs Absolute 18.88 (H) 1.50 - 8.10 k/uL    Absolute Lymph # 1.08 (L) 1.10 - 3.70 k/uL    Absolute Mono # 0.89 0.10 - 1.20 k/uL    Absolute Eos # 0.03 0.00 - 0.44 k/uL    Basophils Absolute 0.04 0.00 - 0.20 k/uL    Absolute Immature Granulocyte 0.09 0.00 - 0.30 k/uL    WBC Morphology NOT REPORTED     RBC Morphology NOT REPORTED     Platelet Estimate NOT REPORTED    Troponin   Result Value Ref Range    Troponin, High Sensitivity 98 (HH) 0 - 14 ng/L    Troponin T NOT REPORTED <0.03 ng/mL    Troponin Interp NOT REPORTED    Basic Metabolic Panel   Result Value Ref Range    Glucose 110 (H) 70 - 99 mg/dL    BUN 16 6 - 20 mg/dL    CREATININE 1.06 (H) 0.50 - 0.90 mg/dL    Bun/Cre Ratio NOT REPORTED 9 - 20    Calcium 8.3 (L) 8.6 - 10.4 mg/dL    Sodium 142 135 - 144 mmol/L    Potassium 3.6 (L) 3.7 - 5.3 mmol/L    Chloride 111 (H) 98 - 107 mmol/L    CO2 20 20 - 31 mmol/L    Anion Gap 11 9 - 17 mmol/L    GFR Non-African American >60 >60 mL/min    GFR African American >60 >60 mL/min    GFR Comment          GFR Staging NOT REPORTED    Basic Metabolic Panel   Result Value Ref Range    Glucose 62 (L) 70 - 99 mg/dL    BUN 13 6 - 20 mg/dL    CREATININE 1.05 (H) 0.50 - 0.90 mg/dL    Bun/Cre Ratio NOT REPORTED 9 - 20    Calcium 7.9 (L) 8.6 - 10.4 mg/dL    Sodium 141 135 - 144 mmol/L    Potassium 3.6 (L) 3.7 - 5.3 mmol/L    Chloride 109 (H) 98 - 107 mmol/L    CO2 18 (L) 20 - 31 mmol/L    Anion Gap 14 9 - 17 mmol/L    GFR Non-African American >60 >60 mL/min    GFR African American >60 >60 mL/min    GFR Comment          GFR Staging NOT REPORTED    Calcium, Ionized   Result Value Ref Range    Calcium, Ion 1.12 (L) 1.13 - 1.33 mmol/L   Calcium, Ionized   Result Value Ref Range    Calcium, Ion 1.11 (L) 1.13 - 1.33 mmol/L   Lactic Acid, Plasma   Result Value Ref Range    Lactic Acid NOT REPORTED mmol/L    Lactic Acid, Whole Blood 1.0 0.7 - 2.1 mmol/L   Lactic Acid, Plasma   Result Value Ref Range    Lactic Acid NOT REPORTED mmol/L    Lactic Acid, Whole Blood 0.6 (L) 0.7 - 2.1 mmol/L   Magnesium   Result Value Ref Range    Magnesium 2.3 1.6 - 2.6 mg/dL   Magnesium   Result Value Ref Range    Magnesium 2.1 1.6 - 2.6 mg/dL   Phosphorus   Result Value Ref Range    Phosphorus 4.1 2.6 - 4.5 mg/dL   Phosphorus   Result Value Ref Range    Phosphorus 3.5 2.6 - 4.5 mg/dL   APTT   Result Value Ref Range    PTT 33.7 (H) 20.5 - 30.5 sec   APTT   Result Value Ref Range    PTT 41.1 (H) 20.5 - 30.5 sec   Basic Metabolic Panel w/ Reflex to MG   Result Value Ref Range    Glucose 84 70 - 99 mg/dL    BUN 13 6 - 20 mg/dL    CREATININE 1.05 (H) 0.50 - 0.90 mg/dL    Bun/Cre Ratio NOT REPORTED 9 - 20    Calcium 7.7 (L) 8.6 - 10.4 mg/dL    Sodium 141 135 - 144 mmol/L    Potassium 3.5 (L) 3.7 - 5.3 mmol/L    Chloride 110 (H) 98 - 107 mmol/L    CO2 17 (L) 20 - 31 mmol/L    Anion Gap 14 9 - 17 mmol/L    GFR Non-African American >60 >60 mL/min    GFR African American >60 >60 mL/min    GFR Comment          GFR Staging NOT REPORTED    CBC auto differential   Result Value Ref Range    WBC 16.7 (H) 3.5 - 11.3 k/uL    RBC 3.94 (L) 3.95 - 5.11 m/uL    Hemoglobin 12.0 11.9 - 15.1 g/dL    Hematocrit 36.6 36.3 - 47.1 %    MCV 92.9 82.6 - 102.9 fL    MCH 30.5 25.2 - 33.5 pg    MCHC 32.8 28.4 - 34.8 g/dL    RDW 13.6 11.8 - 14.4 %    Platelets 638 897 - 311 k/uL    MPV 9.4 8.1 - 13.5 fL    NRBC Automated 0.0 0.0 per 100 WBC    Differential Type NOT REPORTED     Seg Neutrophils 85 (H) 36 - 65 %    Lymphocytes 9 (L) 24 - 43 %    Monocytes 6 3 - 12 %    Eosinophils % 0 (L) 1 - 4 %    Basophils 0 0 - 2 %    Immature Granulocytes 0 0 %    Segs Absolute 14.16 (H) 1.50 - 8.10 k/uL    Absolute Lymph # 1.46 1.10 - 3.70 k/uL    Absolute Mono # 0.95 0.10 - 1.20 k/uL    Absolute Eos # <0.03 0.00 - 0.44 k/uL    Basophils Absolute 0.05 0.00 - 0.20 k/uL    Absolute Immature Granulocyte 0.06 0.00 - 0.30 k/uL    WBC Morphology NOT REPORTED     RBC Morphology NOT REPORTED     Platelet Estimate NOT REPORTED    Basic Metabolic Panel   Result Value Ref Range    Glucose 79 70 - 99 mg/dL    BUN 12 6 - 20 mg/dL    CREATININE 0.97 (H) 0.50 - 0.90 mg/dL    Bun/Cre Ratio NOT REPORTED 9 - 20    Calcium 8.1 (L) 8.6 - 10.4 mg/dL    Sodium 145 (H) 135 - 144 mmol/L    Potassium 4.2 3.7 - 5.3 mmol/L    Chloride 112 (H) 98 - 107 mmol/L    CO2 18 (L) 20 - 31 mmol/L    Anion Gap 15 9 - 17 mmol/L    GFR Non-African American >60 >60 mL/min    GFR African American >60 >60 mL/min    GFR Comment          GFR Staging NOT REPORTED    Calcium, Ionized   Result Value Ref Range    Calcium, Ion 1.08 (L) 1.13 - 1.33 mmol/L   Lactic Acid, Plasma   Result Value Ref Range    Lactic Acid NOT REPORTED mmol/L    Lactic Acid, Whole Blood 0.6 (L) 0.7 - 2.1 mmol/L   Magnesium   Result Value Ref Range    Magnesium 2.0 1.6 - 2.6 mg/dL   Phosphorus   Result Value Ref Range    Phosphorus 2.8 2.6 - 4.5 mg/dL   Magnesium   Result Value Ref Range    Magnesium 2.1 1.6 - 2.6 mg/dL   APTT   Result Value Ref Range    PTT 51.6 (H) 20.5 - 30.5 sec   Hepatic Function Panel   Result Value Ref Range    Albumin 3.4 (L) 3.5 - 5.2 g/dL    Alkaline Phosphatase 58 35 - 104 U/L     (H) 5 - 33 U/L     (H) <32 U/L    Total Bilirubin 0.23 (L) 0.3 - 1.2 mg/dL    Bilirubin, Direct <0.08 <0.31 mg/dL    Bilirubin, Indirect Can not be calculated 0.00 - 1.00 mg/dL    Total Protein 6.0 (L) 6.4 - 8.3 g/dL    Globulin NOT REPORTED 1.5 - 3.8 g/dL    Albumin/Globulin Ratio 1.3 1.0 - 2.5   APTT   Result Value Ref Range    PTT 42.6 (H) 20.5 - 30.5 sec   Basic Metabolic Panel w/ Reflex to MG   Result Value Ref Range    Glucose 93 70 - 99 mg/dL    BUN 9 6 - 20 mg/dL    CREATININE 0.67 0.50 - 0.90 mg/dL    Bun/Cre Ratio NOT REPORTED 9 - 20    Calcium 7.9 (L) 8.6 - 10.4 mg/dL    Sodium 137 135 - 144 mmol/L    Potassium 3.9 3.7 - 5.3 mmol/L    Chloride 109 (H) 98 - 107 mmol/L    CO2 17 (L) 20 - 31 mmol/L    Anion Gap 11 9 - 17 mmol/L    GFR Non-African American >60 >60 mL/min    GFR African American >60 >60 mL/min    GFR Comment          GFR Staging NOT REPORTED    CBC auto differential   Result Value Ref Range    WBC 12.4 (H) 3.5 - 11.3 k/uL    RBC 3.73 (L) 3.95 - 5.11 m/uL    Hemoglobin 11.3 (L) 11.9 - 15.1 g/dL    Hematocrit 34.4 (L) 36.3 - 47.1 %    MCV 92.2 82.6 - 102.9 fL    MCH 30.3 25.2 - 33.5 pg    MCHC 32.8 28.4 - 34.8 g/dL    RDW 13.4 11.8 - 14.4 %    Platelets 815 783 - 847 k/uL    MPV 9.4 8.1 - 13.5 fL    NRBC Automated 0.0 0.0 per 100 WBC    Differential Type NOT REPORTED     Seg Neutrophils 73 (H) 36 - 65 %    Lymphocytes 17 (L) 24 - 43 %    Monocytes 9 3 - 12 %    Eosinophils % 1 1 - 4 %    Basophils 0 0 - 2 %    Immature Granulocytes 0 0 %    Segs Absolute 9.08 (H) 1.50 - 8.10 k/uL    Absolute Lymph # 2.13 1.10 - 3.70 k/uL    Absolute Mono # 1.05 0.10 - 1.20 k/uL    Absolute Eos # 0.08 0.00 - 0.44 k/uL    Basophils Absolute 0.05 0.00 - 0.20 k/uL    Absolute Immature Granulocyte 0.03 0.00 - 0.30 k/uL    WBC Morphology NOT REPORTED     RBC Morphology NOT REPORTED     Platelet Estimate NOT REPORTED    APTT   Result Value Ref Range    PTT 47.8 (H) 20.5 - 30.5 sec   APTT   Result Value Ref Range    PTT 53.9 (H) 20.5 - 30.5 sec   POC Glucose Fingerstick   Result Value Ref Range    POC Glucose 79 65 - 105 mg/dL   POC Glucose Fingerstick   Result Value Ref Range    POC Glucose 87 65 - 105 mg/dL   Arterial Blood Gas, POC   Result Value Ref Range    POC pH 7.334 (L) 7.350 - 7.450    POC pCO2 39.6 35.0 - 48.0 mm Hg    POC PO2 164.6 (H) 83.0 - 108.0 mm Hg    POC HCO3 21.1 21.0 - 28.0 mmol/L    TCO2 (calc), Art NOT REPORTED 22.0 - 29.0 mmol/L    Negative Base Excess, Art 4 (H) 0.0 - 2.0    Positive Base Excess, Art NOT REPORTED 0.0 - 3.0    POC O2 SAT 99 (H) 94.0 - 98.0 %    O2 Device/Flow/% Adult Ventilator     Zaki Test NOT APPLICABLE     Sample Site Arterial Line     Mode NOT REPORTED     FIO2 50.0     Pt Temp 36.9     POC pH Temp NOT REPORTED     POC pCO2 Temp NOT REPORTED mm Hg    POC pO2 Temp NOT REPORTED mm Hg   POCT Glucose   Result Value Ref Range    POC Glucose 72 (L) 74 - 100 mg/dL   Arterial Blood Gas, POC   Result Value Ref Range    POC pH 7.330 (L) 7.350 - 7.450    POC pCO2 41.0 35.0 - 48.0 mm Hg    POC PO2 122.3 (H) 83.0 - 108.0 mm Hg    POC HCO3 21.6 21.0 - 28.0 mmol/L    TCO2 (calc), Art NOT REPORTED 22.0 - 29.0 mmol/L    Negative Base Excess, Art 4 (H) 0.0 - 2.0    Positive Base Excess, Art NOT REPORTED 0.0 - 3.0    POC O2 SAT 98 94.0 - 98.0 %    O2 Device/Flow/% Adult Ventilator     Zaki Test NOT APPLICABLE     Sample Site Arterial Line     Mode PRVC     FIO2 35.0     Pt Temp NOT REPORTED     POC pH Temp NOT REPORTED     POC pCO2 Temp NOT REPORTED mm Hg    POC pO2 Temp NOT REPORTED mm Hg   POCT Glucose   Result Value Ref Range    POC Glucose 66 (L) 74 - 100 mg/dL   POC Glucose Fingerstick   Result Value Ref Range    POC Glucose 150 (H) 65 - 105 mg/dL   POC Glucose Fingerstick   Result Value Ref Range    POC Glucose 137 (H) 65 - 105 mg/dL   POC Glucose Fingerstick   Result Value Ref Range    POC Glucose 110 (H) 65 - 105 mg/dL   Arterial Blood Gas, POC   Result Value Ref Range    POC pH 7.362 7.350 - 7.450    POC pCO2 39.2 35.0 - 48.0 mm Hg    POC PO2 130.0 (H) 83.0 - 108.0 mm Hg    POC HCO3 22.2 21.0 - 28.0 mmol/L    TCO2 (calc), Art NOT REPORTED 22.0 - 29.0 mmol/L    Negative Base Excess, Art 3 (H) 0.0 - 2.0    Positive Base Excess, Art NOT REPORTED 0.0 - 3.0    POC O2 SAT 99 (H) 94.0 - 98.0 %    O2 Device/Flow/% Adult Ventilator     Zaki Test NOT APPLICABLE     Sample Site Arterial Line     Mode PRVC     FIO2 35.0     Pt Temp 37.7     POC pH Temp 7.35     POC pCO2 Temp 40 mm Hg    POC pO2 Temp 135 mm Hg   POCT Glucose   Result Value Ref Range    POC Glucose 83 74 - 100 mg/dL   POC Glucose Fingerstick   Result Value Ref Range    POC Glucose 78 65 - 105 mg/dL   Arterial Blood Gas, POC   Result Value Ref Range    POC pH 7.373 7.350 - 7.450    POC pCO2 35.9 35.0 - 48.0 mm Hg    POC PO2 101.1 83.0 - 108.0 mm Hg    POC HCO3 20.9 (L) 21.0 - 28.0 mmol/L    TCO2 (calc), Art NOT REPORTED 22.0 - 29.0 mmol/L    Negative Base Excess, Art 4 (H) 0.0 - 2.0    Positive Base Excess, Art NOT REPORTED 0.0 - 3.0    POC O2 SAT 98 94.0 - 98.0 %    O2 Device/Flow/% Adult Ventilator     Zaki Test NOT APPLICABLE     Sample Site Arterial Line     Mode PRVC     FIO2 30.0     Pt Temp 38.2     POC pH Temp 7.36     POC pCO2 Temp 38 mm Hg    POC pO2 Temp 109 mm Hg   POCT Glucose   Result Value Ref Range    POC Glucose 69 (L) 74 - 100 mg/dL   POC Glucose Fingerstick   Result Value Ref Range    POC Glucose 110 (H) 65 - 105 mg/dL   POC Glucose Fingerstick   Result Value Ref Range    POC Glucose 85 65 - 105 mg/dL   Arterial Blood Gas, POC   Result Value Ref Range    POC pH 7.364 7.350 - 7.450    POC pCO2 34.3 (L) 35.0 - 48.0 mm Hg    POC PO2 79.4 (L) 83.0 - 108.0 mm Hg    POC HCO3 19.5 (L) 21.0 - 28.0 mmol/L TCO2 (calc), Art NOT REPORTED 22.0 - 29.0 mmol/L    Negative Base Excess, Art 5 (H) 0.0 - 2.0    Positive Base Excess, Art NOT REPORTED 0.0 - 3.0    POC O2 SAT 95 94.0 - 98.0 %    O2 Device/Flow/% Adult Ventilator     Zaki Test NOT APPLICABLE     Sample Site Arterial Line     Mode PRVC     FIO2 30.0     Pt Temp 38.0     POC pH Temp 7.35     POC pCO2 Temp 36 mm Hg    POC pO2 Temp 85 mm Hg   POCT Glucose   Result Value Ref Range    POC Glucose 96 74 - 100 mg/dL   EKG 12 Lead   Result Value Ref Range    Ventricular Rate 100 BPM    Atrial Rate 100 BPM    P-R Interval 162 ms    QRS Duration 98 ms    Q-T Interval 400 ms    QTc Calculation (Bazett) 516 ms    P Axis 47 degrees    R Axis 49 degrees    T Axis 40 degrees       Radiology/Imaging:  XR CHEST PORTABLE   Final Result   1. New central venous catheter overlying IVC and right atrium. Endotracheal   and enteric tubes remain in place. 2.  No acute intrathoracic process. CT CHEST PULMONARY EMBOLISM W CONTRAST   Final Result   No evidence of pulmonary embolism. Mild airspace disease within the right upper lobe posteriorly as well as the   posterior dependent portions of both lung bases. These changes may represent   atelectasis versus mild contusion secondary to prolonged chest compressions. RECOMMENDATIONS:   Unavailable         CT ABDOMEN PELVIS W IV CONTRAST Additional Contrast? None   Final Result   Diffuse dilation of the small and large bowel likely representing a moderate   ileus. Moderate-sized fecalith is identified within the rectal vault. Bibasilar infiltrates greater on the left likely representing dependent   atelectasis. Pulmonary contusion is considered less likely noting history of   extended chest compressions for cardiac arrest.      RECOMMENDATIONS:   Unavailable         CT Head WO Contrast   Final Result   No acute intracranial abnormality. XR CHEST PORTABLE   Final Result   No acute process.   Endotracheal tube located 4.4 cm above the diana             ASSESSMENT:     Patient Active Problem List    Diagnosis Date Noted    Cardiac arrest ([facility] [address] 02/15/2022        PLAN:      WEAN PER PROTOCOL:  []   No  [x]   Yes []   N/A                         ICU PROPHYLAXIS:                Stress ulcer:  [x]   PPI Agent []   [id number] []   Sucralfate []   Other []   None      VTE:  []   Enoxaparin []   SQ Heparin []   EPC Cuffs [x]  heparin drip                       NUTRITION:   []  NPO [x]   TF []   TPN []   PO                       HOME MEDS RECONCILED:  []   No  [x]   Yes                           CONSULTATION NEEDED:  [x]   No  []   Yes                           FAMILY UPDATED:  [x]   No  []   Yes                           TRANSFER OUT OF ICU:  [x]   No  []   Yes             PLAN/MEDICAL DECISION MAKING:  Neurologic/Psych:   · Neuro intact, awake, following commands  · Neuro checks per protocol  · Off sedation  · Reportedly swallowed crack cocaine in a suicide attempt  · In suicide precautions, sitter at bedside  · Psych consult, appreciate recommendations  Cardiovascular:  · Hemodynamically stable  · Heart rate   · Concern for type II NSTEMI  · Currently on heparin drip  · Echo pending  · If echo is low risk, able to stop heparin drip  · Aspirin 81 mg daily  Pulmonary:  · Maintain oxygen sats >92%  · Pulmonary toilet  · Extubated this morning, currently on nasal cannula  GI/Nutrition  ·  GlycoLax as needed  · Ulcer Prophylaxis: Pepcid  · Extubated this morning, will perform bedside swallow  · Patient passes swallow will advance to regular diet  Diet:Diet NPO  · ADULT TUBE FEEDING; Nasogastric; Standard without Fiber; Continuous; 60; No; 30; Q 4 hours  Renal/Fluid/Electrolyte  · IV Fluids: 0.9NS @ 100 mL/Hr   I/O: In: 3690.3 [I.V.:3155.3; NG/GT:422]  · Out: 2420 [Urine:2420]  · UOP: 1.2 cc/kg/hr  · Monitor electrolytes, replace PRN   ID  WBC:   Lab Results   Component Value Date    WBC 12.4 (H) 02/17/2022 · Downtrending  Tmax: Temp (24hrs), Av.5 °F (38.1 °C), Min:99.7 °F (37.6 °C), Max:101.3 °F (38.5 °C)  · Was febrile overnight, continue to monitor temperature  · Antimicrobials: not indicated   Hematology:  Recent Labs     02/15/22  1534 22  0539 22  0552   HGB 12.4 12.0 11.3*   ·  stable  Endocrine:   glucose controlled - most recent BGL is   Recent Labs     22  0539 22  0846 22  0552   GLUCOSE 84 79 93     DVT Prophylaxis  · Heparin  Discharge Needs:  PT, OT, ST, SW and Case Management      CODE STATUS: Full Code      DISPOSITION:  [x] To remain ICU  [] OK for out of ICU from [address] [place] [address],   Emergency Medicine Resident  [address]  2022, 7:19 AM EST

## 2022-02-17 NOTE — PLAN OF CARE
Problem: OXYGENATION/RESPIRATORY FUNCTION  Goal: Patient will maintain patent airway  2/16/2022 2136 by Bro Saenz RCP  Outcome: Ongoing     Problem: OXYGENATION/RESPIRATORY FUNCTION  Goal: Patient will achieve/maintain normal respiratory rate/effort  Description: Respiratory rate and effort will be within normal limits for the patient  2/16/2022 2136 by Bro Saenz RCP  Outcome: Ongoing     Problem: MECHANICAL VENTILATION  Goal: Patient will maintain patent airway  2/16/2022 2136 by Bro Saenz RCP  Outcome: Ongoing     Problem: MECHANICAL VENTILATION  Goal: Oral health is maintained or improved  2/16/2022 2136 by Bro Saenz RCP  Outcome: Ongoing     Problem: MECHANICAL VENTILATION  Goal: Oral health is maintained or improved  2/16/2022 2136 by Bro Saenz RCP  Outcome: Ongoing     Problem: MECHANICAL VENTILATION  Goal: ET tube will be managed safely  2/16/2022 2136 by Bro Saenz RCP  Outcome: Ongoing     Problem: MECHANICAL VENTILATION  Goal: Ability to express needs and understand communication  2/16/2022 2136 by Bro Saenz RCP  Outcome: Ongoing     Problem: MECHANICAL VENTILATION  Goal: Mobility/activity is maintained at optimum level for patient  2/16/2022 2136 by Bro Saenz RCP  Outcome: Ongoing     Problem: SKIN INTEGRITY  Goal: Skin integrity is maintained or improved  2/16/2022 2136 by Bro Saenz RCP  Outcome: Ongoing

## 2022-02-17 NOTE — PROGRESS NOTES
Order obtained for extubation. SpO2 of 100 on 30% FiO2. Patient extubated and placed on 2 liters/min via nasal cannula. Post extubation SpO2 is 97% with HR  118   bpm and RR 22 breaths/min. Patient had strong cough that was non-productive. Extubation Well tolerated by patient. .   Breath Sounds: clear    DONNA MARROQUIN RCP   8:13 AM

## 2022-02-17 NOTE — PROGRESS NOTES
Critical care team - Resident sign-out to medicine service      Date and time: 2/17/2022 1:07 PM  Patient's name:  Myranda Aguirre  Medical Record Number: [id number]  Patient's account/billing number: [de-identified]  Patient's YOB: 2000  Age: 19 y.o. Date of Admission: 2/15/2022  2:56 AM  Length of stay during current admission: 2    Primary Care Physician: No primary care provider on file. Code Status: Full Code    Mode of physician to physician communication:        [] Via telephone   [x] In person     Date and time of sign-out: 2/17/2022 1:07 PM    Accepting Internal Medicine resident: Dr. Volodymyr Bae team: IM Team 3    Accepting team's attending: Dr. Rubens Hutchinson    Patient's current ICU Bed:  116     Patient's assigned bed on floor:  234        [x] Med-Surg Monitored [] Step-down       [] Psychiatry ICU       [] Psych floor     Reason for ICU admission:     Cardiac Arrest    ICU course summary:     22-year-old female, brought in by EMS from correctional facility after swallowing crack cocaine. Jeremiah Healy had a seizure, then experienced a PEA arrest requiring several rounds of epinephrine. [address] was achieved prior to arrival. Jeremiah Healy is hypotensive requiring vasopressor support on arrival.  Intubated and sedated, status post cooling.       Per report, patient made suicidal statements prior to swallowing drugs. Currently in suicide precautions. 2/15: Quatro placed. Cooling started. Began following commands, cooling terminated    2/16:  Awaiting echo. Plan for extubation attempts if patient more responsive.  Patient remained too sleepy to attempt extubation    2/17: Extubated, suicide precautions for suicide attempt, sitter at bedside, need psych consult, uli cornell, arterial line removed, transfer to med-surg    Procedures during patient's ICU stay:     Quatro placed in L fem, A line placed in R fem, Extubation    Current Vitals:     /76   Pulse 103   Temp 100.6 °F (38.1 °C) (Bladder)   Resp 25   Ht 5' 11\" (1.803 m)   Wt 180 lb (81.6 kg)   SpO2 97%   BMI 25.10 kg/m²       Cultures:     Blood cultures:                 [x] None drawn      [] Negative             []  Positive (Details:  )  Urine Culture:                   [x] None drawn      [] Negative             []  Positive (Details:  )  Sputum Culture:               [x] None drawn       [] Negative             []  Positive (Details:  )   Endotracheal aspirate:     [x] None drawn       [] Negative             []  Positive (Details:  )       Consults:     1. Cardiology consulted for elevated troponins, thought to be type II NSTEMI, PEA/Asytolic arrests thought to be due to cocaine use. Started on heparin drip but has since been stopped. Awaiting results of echo. Avoid beta blockers. Assessment:     Patient Active Problem List    Diagnosis Date Noted    Cardiac arrest ([facility] [address] 02/15/2022       Additional assessment:    1. Cardiac Arrest due to cocaine intoxication  2. Type II NSTEMI  3. Suicide attempt    Recommended Follow-up:     1. Regular diet after patient passes swallow study  2. Heparin stopped, begin DVT prophylaxis  3. Suicide attempt - need psych consult  4. Social work consult for cocaine intoxication        Above mentioned assessment and plan was discussed by me with the admitting medicine resident. The medicine team assigned to the patient by medicine admitting resident will be following up the patient from now onwards on the floor.      Anamika Medina DO  Emergency Medicine Resident  Critical care  [address] [facility] ([address] [place] [address])             2/17/2022, 1:07 PM

## 2022-02-18 PROBLEM — F32.A DEPRESSION: Status: ACTIVE | Noted: 2022-02-18

## 2022-02-18 PROBLEM — N17.9 AKI (ACUTE KIDNEY INJURY) (HCC): Status: ACTIVE | Noted: 2022-02-18

## 2022-02-18 PROBLEM — R94.31 QT PROLONGATION: Status: ACTIVE | Noted: 2022-02-18

## 2022-02-18 PROBLEM — R77.8 ELEVATED TROPONIN: Status: ACTIVE | Noted: 2022-02-18

## 2022-02-18 PROBLEM — I21.4 NON-ST ELEVATION MI (NSTEMI) (HCC): Status: ACTIVE | Noted: 2022-02-18

## 2022-02-18 LAB
ABSOLUTE EOS #: 0.15 K/UL (ref 0–0.44)
ABSOLUTE IMMATURE GRANULOCYTE: 0.05 K/UL (ref 0–0.3)
ABSOLUTE LYMPH #: 1.69 K/UL (ref 1.1–3.7)
ABSOLUTE MONO #: 0.91 K/UL (ref 0.1–1.2)
ANION GAP SERPL CALCULATED.3IONS-SCNC: 12 MMOL/L (ref 9–17)
BASOPHILS # BLD: 0 % (ref 0–2)
BASOPHILS ABSOLUTE: 0.05 K/UL (ref 0–0.2)
BUN BLDV-MCNC: 6 MG/DL (ref 6–20)
BUN/CREAT BLD: ABNORMAL (ref 9–20)
CALCIUM SERPL-MCNC: 8.1 MG/DL (ref 8.6–10.4)
CHLORIDE BLD-SCNC: 108 MMOL/L (ref 98–107)
CO2: 16 MMOL/L (ref 20–31)
CREAT SERPL-MCNC: 0.57 MG/DL (ref 0.5–0.9)
DIFFERENTIAL TYPE: ABNORMAL
EOSINOPHILS RELATIVE PERCENT: 1 % (ref 1–4)
GFR AFRICAN AMERICAN: >60 ML/MIN
GFR NON-AFRICAN AMERICAN: >60 ML/MIN
GFR SERPL CREATININE-BSD FRML MDRD: ABNORMAL ML/MIN/{1.73_M2}
GFR SERPL CREATININE-BSD FRML MDRD: ABNORMAL ML/MIN/{1.73_M2}
GLUCOSE BLD-MCNC: 91 MG/DL (ref 70–99)
HCT VFR BLD CALC: 37 % (ref 36.3–47.1)
HEMOGLOBIN: 11.6 G/DL (ref 11.9–15.1)
IMMATURE GRANULOCYTES: 0 %
LYMPHOCYTES # BLD: 13 % (ref 24–43)
MAGNESIUM: 1.7 MG/DL (ref 1.6–2.6)
MCH RBC QN AUTO: 30.4 PG (ref 25.2–33.5)
MCHC RBC AUTO-ENTMCNC: 31.4 G/DL (ref 28.4–34.8)
MCV RBC AUTO: 97.1 FL (ref 82.6–102.9)
MONOCYTES # BLD: 7 % (ref 3–12)
NRBC AUTOMATED: 0 PER 100 WBC
PDW BLD-RTO: 13.2 % (ref 11.8–14.4)
PLATELET # BLD: 223 K/UL (ref 138–453)
PLATELET ESTIMATE: ABNORMAL
PMV BLD AUTO: 9.6 FL (ref 8.1–13.5)
POTASSIUM SERPL-SCNC: 3.5 MMOL/L (ref 3.7–5.3)
RBC # BLD: 3.81 M/UL (ref 3.95–5.11)
RBC # BLD: ABNORMAL 10*6/UL
SEG NEUTROPHILS: 79 % (ref 36–65)
SEGMENTED NEUTROPHILS ABSOLUTE COUNT: 9.83 K/UL (ref 1.5–8.1)
SODIUM BLD-SCNC: 136 MMOL/L (ref 135–144)
WBC # BLD: 12.7 K/UL (ref 3.5–11.3)
WBC # BLD: ABNORMAL 10*3/UL

## 2022-02-18 PROCEDURE — 6370000000 HC RX 637 (ALT 250 FOR IP): Performed by: STUDENT IN AN ORGANIZED HEALTH CARE EDUCATION/TRAINING PROGRAM

## 2022-02-18 PROCEDURE — 90792 PSYCH DIAG EVAL W/MED SRVCS: CPT | Performed by: PSYCHIATRY & NEUROLOGY

## 2022-02-18 PROCEDURE — 2580000003 HC RX 258: Performed by: GENERAL PRACTICE

## 2022-02-18 PROCEDURE — 36415 COLL VENOUS BLD VENIPUNCTURE: CPT

## 2022-02-18 PROCEDURE — 99291 CRITICAL CARE FIRST HOUR: CPT | Performed by: INTERNAL MEDICINE

## 2022-02-18 PROCEDURE — 1200000000 HC SEMI PRIVATE

## 2022-02-18 PROCEDURE — 2500000003 HC RX 250 WO HCPCS: Performed by: GENERAL PRACTICE

## 2022-02-18 PROCEDURE — 85025 COMPLETE CBC W/AUTO DIFF WBC: CPT

## 2022-02-18 PROCEDURE — 83735 ASSAY OF MAGNESIUM: CPT

## 2022-02-18 PROCEDURE — 80048 BASIC METABOLIC PNL TOTAL CA: CPT

## 2022-02-18 PROCEDURE — 6360000002 HC RX W HCPCS: Performed by: GENERAL PRACTICE

## 2022-02-18 PROCEDURE — 6360000002 HC RX W HCPCS: Performed by: STUDENT IN AN ORGANIZED HEALTH CARE EDUCATION/TRAINING PROGRAM

## 2022-02-18 PROCEDURE — 6370000000 HC RX 637 (ALT 250 FOR IP)

## 2022-02-18 PROCEDURE — 6370000000 HC RX 637 (ALT 250 FOR IP): Performed by: GENERAL PRACTICE

## 2022-02-18 RX ORDER — CHOLECALCIFEROL (VITAMIN D3) 125 MCG
5 CAPSULE ORAL ONCE
Status: COMPLETED | OUTPATIENT
Start: 2022-02-18 | End: 2022-02-18

## 2022-02-18 RX ORDER — POTASSIUM CHLORIDE 20 MEQ/1
40 TABLET, EXTENDED RELEASE ORAL ONCE
Status: COMPLETED | OUTPATIENT
Start: 2022-02-18 | End: 2022-02-18

## 2022-02-18 RX ORDER — ASPIRIN 81 MG/1
81 TABLET, CHEWABLE ORAL DAILY
Qty: 30 TABLET | Refills: 3 | Status: ON HOLD | OUTPATIENT
Start: 2022-02-19 | End: 2022-02-23 | Stop reason: HOSPADM

## 2022-02-18 RX ORDER — ASPIRIN 81 MG/1
81 TABLET, CHEWABLE ORAL DAILY
Status: CANCELLED | OUTPATIENT
Start: 2022-02-18

## 2022-02-18 RX ADMIN — SODIUM CHLORIDE: 9 INJECTION, SOLUTION INTRAVENOUS at 05:04

## 2022-02-18 RX ADMIN — SODIUM CHLORIDE, PRESERVATIVE FREE 10 ML: 5 INJECTION INTRAVENOUS at 20:07

## 2022-02-18 RX ADMIN — ASPIRIN 81 MG: 81 TABLET, CHEWABLE ORAL at 07:37

## 2022-02-18 RX ADMIN — ONDANSETRON 4 MG: 2 INJECTION INTRAMUSCULAR; INTRAVENOUS at 23:45

## 2022-02-18 RX ADMIN — MAGNESIUM GLUCONATE 500 MG ORAL TABLET 400 MG: 500 TABLET ORAL at 07:36

## 2022-02-18 RX ADMIN — Medication 5 MG: at 23:23

## 2022-02-18 RX ADMIN — SODIUM CHLORIDE, PRESERVATIVE FREE 10 ML: 5 INJECTION INTRAVENOUS at 07:37

## 2022-02-18 RX ADMIN — MAGNESIUM GLUCONATE 500 MG ORAL TABLET 400 MG: 500 TABLET ORAL at 20:09

## 2022-02-18 RX ADMIN — POTASSIUM CHLORIDE 40 MEQ: 20 TABLET, EXTENDED RELEASE ORAL at 07:10

## 2022-02-18 ASSESSMENT — PAIN SCALES - GENERAL
PAINLEVEL_OUTOF10: 0

## 2022-02-18 ASSESSMENT — ENCOUNTER SYMPTOMS: TACHYPNEA: 1

## 2022-02-18 NOTE — PROGRESS NOTES
Pharmacy Note  Stress Ulcer Prophylaxis Discontinuation    Pharmacist assessment of stress ulcer prophylaxis therapy for Niurka Bryant, 19 y.o. female    Patient Active Problem List   Diagnosis    Cardiac arrest ([facility] [address]     No past medical history on file. Recent Labs     02/15/22  1506   INR 1.1     Recent Labs     02/16/22  0539 02/17/22  0552 02/18/22  0524   HGB 12.0 11.3* 11.6*   HCT 36.6 34.4* 37.0    223 223       Per the [facility] stress ulcer prophylaxis criteria, the following has been discontinued per [employer] Guidelines:    Famotidine 20mg BID                                                                          Stress ulcer prophylaxis criteria:   Any one of the following major risk factors:   Mechanical ventilation ? 48 hours  Coagulopathy (platelets <98,178 mm3, INR >1.5, or aPTT >2 times control, not on anticoagulation)  History of gastrointestinal (GI) ulcerations or GI bleed within past year   Traumatic brain or spinal cord injury   Gary Coma Scale (GCS) ? 10 or inability to obey simple commands  Burn injuries affecting >35% body surface area    At least two of the following minor risk factors:   Length of ICU stay ?7 days  Occult GI bleeding (lasting 6 days or longer)  Sepsis/septic shock (vasopressor support and/or positive microbiologic cultures/suspected infection)  High dose corticosteroid use (>250 mg/day hydrocortisone or equivalent)  Hepatic failure [total bilirubin level >5 mg/dL, AST or ALT >150 U/L (3× ULN)] or partial hepatectomy  Acute renal failure   Transplantation perioperatively in the ICU  Multiple trauma; trauma sustained to more than one body region (injury severity score >15)  _________________________________________________________________________    If the prescriber doesn't feel this discontinuation is appropriate, re-order the medication and place \"SUP appropriate per prescriber\" in comments of the order.     If you would like further assistance or have questions, please contact inpatient pharmacy.      Thank you,   Lisa Arzate, PharmD 2/18/2022 2:42 PM

## 2022-02-18 NOTE — PROGRESS NOTES
INTENSIVE CARE UNIT  Resident Physician Progress Note    Patient - Huy Hernandez  Date of Admission -  2/15/2022  2:56 AM  Date of Evaluation -  2022  Room and Bed Number -  1227 [facility] [place] [facility] - 3    SUBJECTIVE:     HISTORY OF PRESENT ILLNESS:    40-year-old female, brought in by EMS from correctional facility after swallowing crack cocaine.  Patient had a seizure, then experienced a PEA arrest requiring several rounds of epinephrine.  ROSC was achieved prior to arrival. Amari Ano is hypotensive requiring vasopressor support on arrival.  Intubated and sedated, status post cooling.       Per report, patient made suicidal statements prior to swallowing drugs. Currently in suicide precautions. OVERNIGHT EVENTS:      No acute events overnight  Currently on room air  Off all sedation  Had echo performed yesterday which showed EF of 60%, no abnormalities  Heparin drip stopped, transition to Lovenox for DVT prophylaxis  Suicide precautions, sitter at bedside  Psych consulted, appreciate recommendations  [facility]  Patient is medically cleared for transfer to [facility] at this time    OBJECTIVE:     VITAL SIGNS:  Patient Vitals for the past 8 hrs:   BP Temp Temp src Pulse Resp SpO2   22 0600 117/70   102 19 98 %   22 0500 114/76 99.7 °F (37.6 °C) Oral 90 24 98 %   22 0400 111/71   95 29 97 %   22 0300 116/76   91 24 99 %   22 0200 111/68   90 28 98 %   22 0100 118/72   102 21 99 %   22 0000 115/75   97 20 96 %       Last Body weight:   Wt Readings from Last 3 Encounters:   02/15/22 180 lb (81.6 kg)       Body Mass Index : Body mass index is 25.1 kg/m². Tmax over 24 hours: Temp (24hrs), Av.4 °F (38 °C), Min:99.7 °F (37.6 °C), Max:100.8 °F (38.2 °C)      Ins/Outs:    In: 1701.2 [I.V.:1701.2]  Out: 185 [Urine:2885]    PHYSICAL EXAM:  Constitutional: Appears well, in no acute distress, awake, follows commands  EENT: PERRLA, EOMI, sclera clear, anicteric, oropharynx clear, no lesions, neck supple with midline trachea. Neck: Supple, symmetrical, trachea midline, no adenopathy, thyroid symmetric, no jvd skin normal  Respiratory: clear to auscultation, no wheezes or rales and unlabored breathing.  No intercostal tenderness  Cardiovascular: regular rate and rhythm, normal S1, S2, no murmur noted and 2+ pulses throughout  Abdomen: soft, nontender, nondistended, no masses or organomegaly  Extremities:  peripheral pulses normal, no pedal edema, no clubbing or cyanosis    MEDICATIONS:  Scheduled Meds:   potassium chloride  40 mEq Oral Once    magnesium oxide  400 mg Oral BID    enoxaparin  40 mg SubCUTAneous Daily    sodium chloride flush  5-40 mL IntraVENous 2 times per day    famotidine (PEPCID) injection  20 mg IntraVENous BID    chlorhexidine  15 mL Mouth/Throat BID    aspirin  81 mg Oral Daily       Continuous Infusions:   sodium chloride      sodium chloride 100 mL/hr at 02/18/22 0520    dextrose         PRN Meds:   sodium chloride flush, 5-40 mL, PRN  sodium chloride, 25 mL, PRN  polyethylene glycol, 17 g, Daily PRN  acetaminophen, 650 mg, Q6H PRN   Or  acetaminophen, 650 mg, Q6H PRN  ondansetron, 4 mg, Q8H PRN   Or  ondansetron, 4 mg, Q6H PRN  glucose, 15 g, PRN  dextrose, 12.5 g, PRN  glucagon (rDNA), 1 mg, PRN  dextrose, 100 mL/hr, PRN        SUPPORT DEVICES: [] Ventilator [] BIPAP  [] Nasal Cannula [x] Room Air    DATA:  Complete Blood Count:   Recent Labs     02/16/22  0539 02/17/22  0552 02/18/22  0524   WBC 16.7* 12.4* 12.7*   RBC 3.94* 3.73* 3.81*   HGB 12.0 11.3* 11.6*   HCT 36.6 34.4* 37.0   MCV 92.9 92.2 97.1   MCH 30.5 30.3 30.4   MCHC 32.8 32.8 31.4   RDW 13.6 13.4 13.2    223 223   MPV 9.4 9.4 9.6        Last 3 Blood Glucose:   Recent Labs     02/15/22  1506 02/15/22  2139 02/16/22  0321 02/16/22  0539 02/16/22  0846 02/17/22  0552 02/18/22  0524   GLUCOSE 77 110* 62* 84 79 93 91 PT/INR:    Lab Results   Component Value Date    PROTIME 11.8 02/15/2022    INR 1.1 02/15/2022     PTT:    Lab Results   Component Value Date    APTT 53.9 02/17/2022       Basic Metabolic Profile:   Recent Labs     02/15/22  2139 02/15/22  2139 02/16/22  0321 02/16/22  0539 02/16/22  0846 02/17/22  0552 02/18/22  0524      < > 141   < > 145* 137 136   K 3.6*   < > 3.6*   < > 4.2 3.9 3.5*   *   < > 109*   < > 112* 109* 108*   CO2 20   < > 18*   < > 18* 17* 16*   BUN 16   < > 13   < > 12 9 6   CREATININE 1.06*   < > 1.05*   < > 0.97* 0.67 0.57   GLUCOSE 110*   < > 62*   < > 79 93 91   PHOS 4.1  --  3.5  --  2.8  --   --     < > = values in this interval not displayed.        Liver Function:  Recent Labs     02/16/22  0846   PROT 6.0*   LABALBU 3.4*   *   *   ALKPHOS 58   BILITOT 0.23*       Magnesium:   Lab Results   Component Value Date    MG 1.7 02/18/2022    MG 2.0 02/16/2022    MG 2.1 02/16/2022     Phosphorus:   Lab Results   Component Value Date    PHOS 2.8 02/16/2022    PHOS 3.5 02/16/2022    PHOS 4.1 02/15/2022     Ionized Calcium:   Lab Results   Component Value Date    CAION 1.08 02/16/2022    CAION 1.11 02/16/2022    CAION 1.12 02/15/2022        Urinalysis:   Lab Results   Component Value Date    NITRU NEGATIVE 02/15/2022    COLORU Yellow 02/15/2022    PHUR 6.5 02/15/2022    WBCUA 10 TO 20 02/15/2022    RBCUA 5 TO 10 02/15/2022    MUCUS NOT REPORTED 02/15/2022    TRICHOMONAS NOT REPORTED 02/15/2022    YEAST FEW 02/15/2022    BACTERIA MANY 02/15/2022    SPECGRAV 1.021 02/15/2022    LEUKOCYTESUR TRACE 02/15/2022    UROBILINOGEN Normal 02/15/2022    BILIRUBINUR NEGATIVE 02/15/2022    GLUCOSEU NEGATIVE 02/15/2022    KETUA TRACE 02/15/2022    AMORPHOUS NOT REPORTED 02/15/2022     TSH:    Lab Results   Component Value Date    TSH 1.61 10/29/2013     Other Labs:  Results for orders placed or performed during the hospital encounter of 02/15/22   COVID-19, Rapid    Specimen: Nasopharyngeal Swab   Result Value Ref Range    Specimen Description . NASOPHARYNGEAL SWAB     SARS-CoV-2, Rapid Not Detected Not Detected   CBC Auto Differential   Result Value Ref Range    WBC 19.6 (H) 3.5 - 11.3 k/uL    RBC 4.30 3.95 - 5.11 m/uL    Hemoglobin 12.9 11.9 - 15.1 g/dL    Hematocrit 40.8 36.3 - 47.1 %    MCV 94.9 82.6 - 102.9 fL    MCH 30.0 25.2 - 33.5 pg    MCHC 31.6 28.4 - 34.8 g/dL    RDW 13.4 11.8 - 14.4 %    Platelets 188 972 - 116 k/uL    MPV 9.4 8.1 - 13.5 fL    NRBC Automated 0.0 0.0 per 100 WBC    Differential Type NOT REPORTED     Seg Neutrophils 83 (H) 36 - 65 %    Lymphocytes 9 (L) 24 - 43 %    Monocytes 6 3 - 12 %    Eosinophils % 0 (L) 1 - 4 %    Basophils 0 0 - 2 %    Immature Granulocytes 2 (H) 0 %    Segs Absolute 16.23 (H) 1.50 - 8.10 k/uL    Absolute Lymph # 1.74 1.10 - 3.70 k/uL    Absolute Mono # 1.11 0.10 - 1.20 k/uL    Absolute Eos # 0.05 0.00 - 0.44 k/uL    Basophils Absolute 0.05 0.00 - 0.20 k/uL    Absolute Immature Granulocyte 0.39 (H) 0.00 - 0.30 k/uL    WBC Morphology NOT REPORTED     RBC Morphology NOT REPORTED     Platelet Estimate NOT REPORTED    Comprehensive Metabolic Panel w/ Reflex to MG   Result Value Ref Range    Glucose 45 (L) 70 - 99 mg/dL    BUN 13 6 - 20 mg/dL    CREATININE 1.31 (H) 0.50 - 0.90 mg/dL    Bun/Cre Ratio NOT REPORTED 9 - 20    Calcium 8.4 (L) 8.6 - 10.4 mg/dL    Sodium 137 135 - 144 mmol/L    Potassium 4.0 3.7 - 5.3 mmol/L    Chloride 108 (H) 98 - 107 mmol/L    CO2 15 (L) 20 - 31 mmol/L    Anion Gap 14 9 - 17 mmol/L    Alkaline Phosphatase 78 35 - 104 U/L     (H) 5 - 33 U/L     (H) <32 U/L    Total Bilirubin <0.10 (L) 0.3 - 1.2 mg/dL    Total Protein 6.7 6.4 - 8.3 g/dL    Albumin 4.0 3.5 - 5.2 g/dL    Albumin/Globulin Ratio 1.5 1.0 - 2.5    GFR Non- 50 (L) >60 mL/min    GFR African American >60 >60 mL/min    GFR Comment          GFR Staging NOT REPORTED    Troponin   Result Value Ref Range    Troponin, High Sensitivity 116 (HH) 0 - 14 ng/L    Troponin T NOT REPORTED <0.03 ng/mL    Troponin Interp NOT REPORTED    Brain Natriuretic Peptide   Result Value Ref Range    Pro- <300 pg/mL    BNP Interpretation NOT REPORTED    Protime-INR   Result Value Ref Range    Protime 10.8 9.1 - 12.3 sec    INR 1.0    APTT   Result Value Ref Range    PTT 21.6 20.5 - 30.5 sec   TOX SCR, BLD, ED   Result Value Ref Range    Acetaminophen Level <5 (L) 10 - 30 ug/mL    Ethanol <10 <10 mg/dL    Ethanol percent <7.473 <1.718 %    Salicylate Lvl <1 (L) 3 - 10 mg/dL    Toxic Tricyclic Sc,Blood NEGATIVE NEGATIVE   Urine Drug Screen   Result Value Ref Range    Amphetamine Screen, Ur NEGATIVE NEGATIVE    Barbiturate Screen, Ur NEGATIVE NEGATIVE    Benzodiazepine Screen, Urine NEGATIVE NEGATIVE    Cocaine Metabolite, Urine NEGATIVE NEGATIVE    Methadone Screen, Urine NEGATIVE NEGATIVE    Opiates, Urine NEGATIVE NEGATIVE    Phencyclidine, Urine NEGATIVE NEGATIVE    Propoxyphene, Urine NOT REPORTED NEGATIVE    Cannabinoid Scrn, Ur NEGATIVE NEGATIVE    Oxycodone Screen, Ur NEGATIVE NEGATIVE    Methamphetamine, Urine NOT REPORTED NEGATIVE    Tricyclic Antidepressants, Urine NOT REPORTED NEGATIVE    MDMA, Urine NOT REPORTED NEGATIVE    Buprenorphine Urine NOT REPORTED NEGATIVE    Test Information       Assay provides medical screening only. The absence of expected drug(s) and/or metabolite(s) may indicate diluted or adulterated urine, limitations of testing or timing of collection.    HCG Qualitative, Serum   Result Value Ref Range    hCG Qual NEGATIVE NEGATIVE   URINALYSIS   Result Value Ref Range    Color, UA Yellow Yellow    Turbidity UA Cloudy (A) Clear    Glucose, Ur NEGATIVE NEGATIVE    Bilirubin Urine NEGATIVE NEGATIVE    Ketones, Urine TRACE (A) NEGATIVE    Specific Gravity, UA 1.021 1.005 - 1.030    Urine Hgb LARGE (A) NEGATIVE    pH, UA 6.5 5.0 - 8.0    Protein, UA 2+ (A) NEGATIVE    Urobilinogen, Urine Normal Normal    Nitrite, Urine NEGATIVE NEGATIVE    Leukocyte Esterase, Urine TRACE (A) NEGATIVE    Urinalysis Comments NOT REPORTED    Microscopic Urinalysis   Result Value Ref Range    -          WBC, UA 10 TO 20 0 - 5 /HPF    RBC, UA 5 TO 10 0 - 2 /HPF    Casts UA FINE GRANULAR 0 - 2 /LPF    Casts UA 2 TO 5 0 - 2 /LPF    Casts UA 5 TO 10 0 - 2 /LPF    Casts UA HYALINE 0 - 2 /LPF    Crystals, UA NOT REPORTED None /HPF    Epithelial Cells UA 20 TO 50 0 - 5 /HPF    Renal Epithelial, UA NOT REPORTED 0 /HPF    Bacteria, UA MANY (A) None    Mucus, UA NOT REPORTED None    Trichomonas, UA NOT REPORTED None    Amorphous, UA NOT REPORTED None    Other Observations UA NOT REPORTED NOT REQ.     Yeast, UA FEW (A) None   Basic Metabolic Panel w/ Reflex to MG   Result Value Ref Range    Glucose 77 70 - 99 mg/dL    BUN 18 6 - 20 mg/dL    CREATININE 1.18 (H) 0.50 - 0.90 mg/dL    Bun/Cre Ratio NOT REPORTED 9 - 20    Calcium 7.6 (L) 8.6 - 10.4 mg/dL    Sodium 143 135 - 144 mmol/L    Potassium 3.6 (L) 3.7 - 5.3 mmol/L    Chloride 109 (H) 98 - 107 mmol/L    CO2 18 (L) 20 - 31 mmol/L    Anion Gap 16 9 - 17 mmol/L    GFR Non-African American 57 (L) >60 mL/min    GFR African American >60 >60 mL/min    GFR Comment          GFR Staging NOT REPORTED    Calcium, Ionized   Result Value Ref Range    Calcium, Ion 1.06 (L) 1.13 - 1.33 mmol/L   Lactic Acid, Whole Blood   Result Value Ref Range    Lactic Acid, Whole Blood 0.6 (L) 0.7 - 2.1 mmol/L   Magnesium   Result Value Ref Range    Magnesium 2.6 1.6 - 2.6 mg/dL   Phosphorus   Result Value Ref Range    Phosphorus 4.9 (H) 2.6 - 4.5 mg/dL   Protime-INR   Result Value Ref Range    Protime 11.8 9.1 - 12.3 sec    INR 1.1    APTT   Result Value Ref Range    PTT 35.0 (H) 20.5 - 30.5 sec   CBC Auto Differential   Result Value Ref Range    WBC 21.0 (H) 3.5 - 11.3 k/uL    RBC 4.07 3.95 - 5.11 m/uL    Hemoglobin 12.4 11.9 - 15.1 g/dL    Hematocrit 38.1 36.3 - 47.1 %    MCV 93.6 82.6 - 102.9 fL    MCH 30.5 25.2 - 33.5 pg    MCHC 32.5 28.4 - 34.8 g/dL    RDW 13.5 11.8 - 14.4 %    Platelets 102 944 - 558 k/uL    MPV 8.9 8.1 - 13.5 fL    NRBC Automated 0.0 0.0 per 100 WBC    Differential Type NOT REPORTED     Seg Neutrophils 91 (H) 36 - 65 %    Lymphocytes 5 (L) 24 - 43 %    Monocytes 4 3 - 12 %    Eosinophils % 0 (L) 1 - 4 %    Basophils 0 0 - 2 %    Immature Granulocytes 0 0 %    Segs Absolute 18.88 (H) 1.50 - 8.10 k/uL    Absolute Lymph # 1.08 (L) 1.10 - 3.70 k/uL    Absolute Mono # 0.89 0.10 - 1.20 k/uL    Absolute Eos # 0.03 0.00 - 0.44 k/uL    Basophils Absolute 0.04 0.00 - 0.20 k/uL    Absolute Immature Granulocyte 0.09 0.00 - 0.30 k/uL    WBC Morphology NOT REPORTED     RBC Morphology NOT REPORTED     Platelet Estimate NOT REPORTED    Troponin   Result Value Ref Range    Troponin, High Sensitivity 98 (HH) 0 - 14 ng/L    Troponin T NOT REPORTED <0.03 ng/mL    Troponin Interp NOT REPORTED    Basic Metabolic Panel   Result Value Ref Range    Glucose 110 (H) 70 - 99 mg/dL    BUN 16 6 - 20 mg/dL    CREATININE 1.06 (H) 0.50 - 0.90 mg/dL    Bun/Cre Ratio NOT REPORTED 9 - 20    Calcium 8.3 (L) 8.6 - 10.4 mg/dL    Sodium 142 135 - 144 mmol/L    Potassium 3.6 (L) 3.7 - 5.3 mmol/L    Chloride 111 (H) 98 - 107 mmol/L    CO2 20 20 - 31 mmol/L    Anion Gap 11 9 - 17 mmol/L    GFR Non-African American >60 >60 mL/min    GFR African American >60 >60 mL/min    GFR Comment          GFR Staging NOT REPORTED    Basic Metabolic Panel   Result Value Ref Range    Glucose 62 (L) 70 - 99 mg/dL    BUN 13 6 - 20 mg/dL    CREATININE 1.05 (H) 0.50 - 0.90 mg/dL    Bun/Cre Ratio NOT REPORTED 9 - 20    Calcium 7.9 (L) 8.6 - 10.4 mg/dL    Sodium 141 135 - 144 mmol/L    Potassium 3.6 (L) 3.7 - 5.3 mmol/L    Chloride 109 (H) 98 - 107 mmol/L    CO2 18 (L) 20 - 31 mmol/L    Anion Gap 14 9 - 17 mmol/L    GFR Non-African American >60 >60 mL/min    GFR African American >60 >60 mL/min    GFR Comment          GFR Staging NOT REPORTED    Calcium, Ionized   Result Value Ref Range    Calcium, Ion 1.12 (L) 1.13 - 1.33 mmol/L   Calcium, Ionized   Result Value Ref Range    Calcium, Ion 1.11 (L) 1.13 - 1.33 mmol/L   Lactic Acid, Plasma   Result Value Ref Range    Lactic Acid NOT REPORTED mmol/L    Lactic Acid, Whole Blood 1.0 0.7 - 2.1 mmol/L   Lactic Acid, Plasma   Result Value Ref Range    Lactic Acid NOT REPORTED mmol/L    Lactic Acid, Whole Blood 0.6 (L) 0.7 - 2.1 mmol/L   Magnesium   Result Value Ref Range    Magnesium 2.3 1.6 - 2.6 mg/dL   Magnesium   Result Value Ref Range    Magnesium 2.1 1.6 - 2.6 mg/dL   Phosphorus   Result Value Ref Range    Phosphorus 4.1 2.6 - 4.5 mg/dL   Phosphorus   Result Value Ref Range    Phosphorus 3.5 2.6 - 4.5 mg/dL   APTT   Result Value Ref Range    PTT 33.7 (H) 20.5 - 30.5 sec   APTT   Result Value Ref Range    PTT 41.1 (H) 20.5 - 30.5 sec   Basic Metabolic Panel w/ Reflex to MG   Result Value Ref Range    Glucose 84 70 - 99 mg/dL    BUN 13 6 - 20 mg/dL    CREATININE 1.05 (H) 0.50 - 0.90 mg/dL    Bun/Cre Ratio NOT REPORTED 9 - 20    Calcium 7.7 (L) 8.6 - 10.4 mg/dL    Sodium 141 135 - 144 mmol/L    Potassium 3.5 (L) 3.7 - 5.3 mmol/L    Chloride 110 (H) 98 - 107 mmol/L    CO2 17 (L) 20 - 31 mmol/L    Anion Gap 14 9 - 17 mmol/L    GFR Non-African American >60 >60 mL/min    GFR African American >60 >60 mL/min    GFR Comment          GFR Staging NOT REPORTED    CBC auto differential   Result Value Ref Range    WBC 16.7 (H) 3.5 - 11.3 k/uL    RBC 3.94 (L) 3.95 - 5.11 m/uL    Hemoglobin 12.0 11.9 - 15.1 g/dL    Hematocrit 36.6 36.3 - 47.1 %    MCV 92.9 82.6 - 102.9 fL    MCH 30.5 25.2 - 33.5 pg    MCHC 32.8 28.4 - 34.8 g/dL    RDW 13.6 11.8 - 14.4 %    Platelets 772 004 - 695 k/uL    MPV 9.4 8.1 - 13.5 fL    NRBC Automated 0.0 0.0 per 100 WBC    Differential Type NOT REPORTED     Seg Neutrophils 85 (H) 36 - 65 %    Lymphocytes 9 (L) 24 - 43 %    Monocytes 6 3 - 12 %    Eosinophils % 0 (L) 1 - 4 %    Basophils 0 0 - 2 %    Immature Granulocytes 0 0 %    Segs Absolute 14.16 (H) 1.50 - 8.10 k/uL    Absolute Lymph # 1.46 1.10 - 3.70 k/uL    Absolute Mono # 0.95 0.10 - 1.20 k/uL    Absolute Eos # <0.03 0.00 - 0.44 k/uL    Basophils Absolute 0.05 0.00 - 0.20 k/uL    Absolute Immature Granulocyte 0.06 0.00 - 0.30 k/uL    WBC Morphology NOT REPORTED     RBC Morphology NOT REPORTED     Platelet Estimate NOT REPORTED    Basic Metabolic Panel   Result Value Ref Range    Glucose 79 70 - 99 mg/dL    BUN 12 6 - 20 mg/dL    CREATININE 0.97 (H) 0.50 - 0.90 mg/dL    Bun/Cre Ratio NOT REPORTED 9 - 20    Calcium 8.1 (L) 8.6 - 10.4 mg/dL    Sodium 145 (H) 135 - 144 mmol/L    Potassium 4.2 3.7 - 5.3 mmol/L    Chloride 112 (H) 98 - 107 mmol/L    CO2 18 (L) 20 - 31 mmol/L    Anion Gap 15 9 - 17 mmol/L    GFR Non-African American >60 >60 mL/min    GFR African American >60 >60 mL/min    GFR Comment          GFR Staging NOT REPORTED    Calcium, Ionized   Result Value Ref Range    Calcium, Ion 1.08 (L) 1.13 - 1.33 mmol/L   Lactic Acid, Plasma   Result Value Ref Range    Lactic Acid NOT REPORTED mmol/L    Lactic Acid, Whole Blood 0.6 (L) 0.7 - 2.1 mmol/L   Magnesium   Result Value Ref Range    Magnesium 2.0 1.6 - 2.6 mg/dL   Phosphorus   Result Value Ref Range    Phosphorus 2.8 2.6 - 4.5 mg/dL   Magnesium   Result Value Ref Range    Magnesium 2.1 1.6 - 2.6 mg/dL   APTT   Result Value Ref Range    PTT 51.6 (H) 20.5 - 30.5 sec   Hepatic Function Panel   Result Value Ref Range    Albumin 3.4 (L) 3.5 - 5.2 g/dL    Alkaline Phosphatase 58 35 - 104 U/L     (H) 5 - 33 U/L     (H) <32 U/L    Total Bilirubin 0.23 (L) 0.3 - 1.2 mg/dL    Bilirubin, Direct <0.08 <0.31 mg/dL    Bilirubin, Indirect Can not be calculated 0.00 - 1.00 mg/dL    Total Protein 6.0 (L) 6.4 - 8.3 g/dL    Globulin NOT REPORTED 1.5 - 3.8 g/dL    Albumin/Globulin Ratio 1.3 1.0 - 2.5   APTT   Result Value Ref Range    PTT 42.6 (H) 20.5 - 30.5 sec   Basic Metabolic Panel w/ Reflex to MG   Result Value Ref Range    Glucose 93 70 - 99 mg/dL BUN 9 6 - 20 mg/dL    CREATININE 0.67 0.50 - 0.90 mg/dL    Bun/Cre Ratio NOT REPORTED 9 - 20    Calcium 7.9 (L) 8.6 - 10.4 mg/dL    Sodium 137 135 - 144 mmol/L    Potassium 3.9 3.7 - 5.3 mmol/L    Chloride 109 (H) 98 - 107 mmol/L    CO2 17 (L) 20 - 31 mmol/L    Anion Gap 11 9 - 17 mmol/L    GFR Non-African American >60 >60 mL/min    GFR African American >60 >60 mL/min    GFR Comment          GFR Staging NOT REPORTED    CBC auto differential   Result Value Ref Range    WBC 12.4 (H) 3.5 - 11.3 k/uL    RBC 3.73 (L) 3.95 - 5.11 m/uL    Hemoglobin 11.3 (L) 11.9 - 15.1 g/dL    Hematocrit 34.4 (L) 36.3 - 47.1 %    MCV 92.2 82.6 - 102.9 fL    MCH 30.3 25.2 - 33.5 pg    MCHC 32.8 28.4 - 34.8 g/dL    RDW 13.4 11.8 - 14.4 %    Platelets 303 556 - 249 k/uL    MPV 9.4 8.1 - 13.5 fL    NRBC Automated 0.0 0.0 per 100 WBC    Differential Type NOT REPORTED     Seg Neutrophils 73 (H) 36 - 65 %    Lymphocytes 17 (L) 24 - 43 %    Monocytes 9 3 - 12 %    Eosinophils % 1 1 - 4 %    Basophils 0 0 - 2 %    Immature Granulocytes 0 0 %    Segs Absolute 9.08 (H) 1.50 - 8.10 k/uL    Absolute Lymph # 2.13 1.10 - 3.70 k/uL    Absolute Mono # 1.05 0.10 - 1.20 k/uL    Absolute Eos # 0.08 0.00 - 0.44 k/uL    Basophils Absolute 0.05 0.00 - 0.20 k/uL    Absolute Immature Granulocyte 0.03 0.00 - 0.30 k/uL    WBC Morphology NOT REPORTED     RBC Morphology NOT REPORTED     Platelet Estimate NOT REPORTED    APTT   Result Value Ref Range    PTT 47.8 (H) 20.5 - 30.5 sec   APTT   Result Value Ref Range    PTT 53.9 (H) 20.5 - 30.5 sec   Basic Metabolic Panel w/ Reflex to MG   Result Value Ref Range    Glucose 91 70 - 99 mg/dL    BUN 6 6 - 20 mg/dL    CREATININE 0.57 0.50 - 0.90 mg/dL    Bun/Cre Ratio NOT REPORTED 9 - 20    Calcium 8.1 (L) 8.6 - 10.4 mg/dL    Sodium 136 135 - 144 mmol/L    Potassium 3.5 (L) 3.7 - 5.3 mmol/L    Chloride 108 (H) 98 - 107 mmol/L    CO2 16 (L) 20 - 31 mmol/L    Anion Gap 12 9 - 17 mmol/L    GFR Non-African American >60 >60 mL/min GFR African American >60 >60 mL/min    GFR Comment          GFR Staging NOT REPORTED    CBC auto differential   Result Value Ref Range    WBC 12.7 (H) 3.5 - 11.3 k/uL    RBC 3.81 (L) 3.95 - 5.11 m/uL    Hemoglobin 11.6 (L) 11.9 - 15.1 g/dL    Hematocrit 37.0 36.3 - 47.1 %    MCV 97.1 82.6 - 102.9 fL    MCH 30.4 25.2 - 33.5 pg    MCHC 31.4 28.4 - 34.8 g/dL    RDW 13.2 11.8 - 14.4 %    Platelets 300 288 - 467 k/uL    MPV 9.6 8.1 - 13.5 fL    NRBC Automated 0.0 0.0 per 100 WBC    Differential Type NOT REPORTED     Seg Neutrophils 79 (H) 36 - 65 %    Lymphocytes 13 (L) 24 - 43 %    Monocytes 7 3 - 12 %    Eosinophils % 1 1 - 4 %    Basophils 0 0 - 2 %    Immature Granulocytes 0 0 %    Segs Absolute 9.83 (H) 1.50 - 8.10 k/uL    Absolute Lymph # 1.69 1.10 - 3.70 k/uL    Absolute Mono # 0.91 0.10 - 1.20 k/uL    Absolute Eos # 0.15 0.00 - 0.44 k/uL    Basophils Absolute 0.05 0.00 - 0.20 k/uL    Absolute Immature Granulocyte 0.05 0.00 - 0.30 k/uL    WBC Morphology NOT REPORTED     RBC Morphology NOT REPORTED     Platelet Estimate NOT REPORTED    Magnesium   Result Value Ref Range    Magnesium 1.7 1.6 - 2.6 mg/dL   POC Glucose Fingerstick   Result Value Ref Range    POC Glucose 79 65 - 105 mg/dL   POC Glucose Fingerstick   Result Value Ref Range    POC Glucose 87 65 - 105 mg/dL   Arterial Blood Gas, POC   Result Value Ref Range    POC pH 7.334 (L) 7.350 - 7.450    POC pCO2 39.6 35.0 - 48.0 mm Hg    POC PO2 164.6 (H) 83.0 - 108.0 mm Hg    POC HCO3 21.1 21.0 - 28.0 mmol/L    TCO2 (calc), Art NOT REPORTED 22.0 - 29.0 mmol/L    Negative Base Excess, Art 4 (H) 0.0 - 2.0    Positive Base Excess, Art NOT REPORTED 0.0 - 3.0    POC O2 SAT 99 (H) 94.0 - 98.0 %    O2 Device/Flow/% Adult Ventilator     Zaki Test NOT APPLICABLE     Sample Site Arterial Line     Mode NOT REPORTED     FIO2 50.0     Pt Temp 36.9     POC pH Temp NOT REPORTED     POC pCO2 Temp NOT REPORTED mm Hg    POC pO2 Temp NOT REPORTED mm Hg   POCT Glucose   Result Value Ref Range    POC Glucose 72 (L) 74 - 100 mg/dL   Arterial Blood Gas, POC   Result Value Ref Range    POC pH 7.330 (L) 7.350 - 7.450    POC pCO2 41.0 35.0 - 48.0 mm Hg    POC PO2 122.3 (H) 83.0 - 108.0 mm Hg    POC HCO3 21.6 21.0 - 28.0 mmol/L    TCO2 (calc), Art NOT REPORTED 22.0 - 29.0 mmol/L    Negative Base Excess, Art 4 (H) 0.0 - 2.0    Positive Base Excess, Art NOT REPORTED 0.0 - 3.0    POC O2 SAT 98 94.0 - 98.0 %    O2 Device/Flow/% Adult Ventilator     Zaki Test NOT APPLICABLE     Sample Site Arterial Line     Mode PRVC     FIO2 35.0     Pt Temp NOT REPORTED     POC pH Temp NOT REPORTED     POC pCO2 Temp NOT REPORTED mm Hg    POC pO2 Temp NOT REPORTED mm Hg   POCT Glucose   Result Value Ref Range    POC Glucose 66 (L) 74 - 100 mg/dL   POC Glucose Fingerstick   Result Value Ref Range    POC Glucose 150 (H) 65 - 105 mg/dL   POC Glucose Fingerstick   Result Value Ref Range    POC Glucose 137 (H) 65 - 105 mg/dL   POC Glucose Fingerstick   Result Value Ref Range    POC Glucose 110 (H) 65 - 105 mg/dL   Arterial Blood Gas, POC   Result Value Ref Range    POC pH 7.362 7.350 - 7.450    POC pCO2 39.2 35.0 - 48.0 mm Hg    POC PO2 130.0 (H) 83.0 - 108.0 mm Hg    POC HCO3 22.2 21.0 - 28.0 mmol/L    TCO2 (calc), Art NOT REPORTED 22.0 - 29.0 mmol/L    Negative Base Excess, Art 3 (H) 0.0 - 2.0    Positive Base Excess, Art NOT REPORTED 0.0 - 3.0    POC O2 SAT 99 (H) 94.0 - 98.0 %    O2 Device/Flow/% Adult Ventilator     Zaki Test NOT APPLICABLE     Sample Site Arterial Line     Mode PRVC     FIO2 35.0     Pt Temp 37.7     POC pH Temp 7.35     POC pCO2 Temp 40 mm Hg    POC pO2 Temp 135 mm Hg   POCT Glucose   Result Value Ref Range    POC Glucose 83 74 - 100 mg/dL   POC Glucose Fingerstick   Result Value Ref Range    POC Glucose 78 65 - 105 mg/dL   Arterial Blood Gas, POC   Result Value Ref Range    POC pH 7.373 7.350 - 7.450    POC pCO2 35.9 35.0 - 48.0 mm Hg    POC PO2 101.1 83.0 - 108.0 mm Hg    POC HCO3 20.9 (L) 21.0 - 28.0 mmol/L    TCO2 (calc), Art NOT REPORTED 22.0 - 29.0 mmol/L    Negative Base Excess, Art 4 (H) 0.0 - 2.0    Positive Base Excess, Art NOT REPORTED 0.0 - 3.0    POC O2 SAT 98 94.0 - 98.0 %    O2 Device/Flow/% Adult Ventilator     Zaki Test NOT APPLICABLE     Sample Site Arterial Line     Mode PRVC     FIO2 30.0     Pt Temp 38.2     POC pH Temp 7.36     POC pCO2 Temp 38 mm Hg    POC pO2 Temp 109 mm Hg   POCT Glucose   Result Value Ref Range    POC Glucose 69 (L) 74 - 100 mg/dL   POC Glucose Fingerstick   Result Value Ref Range    POC Glucose 110 (H) 65 - 105 mg/dL   POC Glucose Fingerstick   Result Value Ref Range    POC Glucose 85 65 - 105 mg/dL   Arterial Blood Gas, POC   Result Value Ref Range    POC pH 7.364 7.350 - 7.450    POC pCO2 34.3 (L) 35.0 - 48.0 mm Hg    POC PO2 79.4 (L) 83.0 - 108.0 mm Hg    POC HCO3 19.5 (L) 21.0 - 28.0 mmol/L    TCO2 (calc), Art NOT REPORTED 22.0 - 29.0 mmol/L    Negative Base Excess, Art 5 (H) 0.0 - 2.0    Positive Base Excess, Art NOT REPORTED 0.0 - 3.0    POC O2 SAT 95 94.0 - 98.0 %    O2 Device/Flow/% Adult Ventilator     Zaki Test NOT APPLICABLE     Sample Site Arterial Line     Mode PRVC     FIO2 30.0     Pt Temp 38.0     POC pH Temp 7.35     POC pCO2 Temp 36 mm Hg    POC pO2 Temp 85 mm Hg   POCT Glucose   Result Value Ref Range    POC Glucose 96 74 - 100 mg/dL   POC Glucose Fingerstick   Result Value Ref Range    POC Glucose 207 (H) 65 - 105 mg/dL   EKG 12 Lead   Result Value Ref Range    Ventricular Rate 100 BPM    Atrial Rate 100 BPM    P-R Interval 162 ms    QRS Duration 98 ms    Q-T Interval 400 ms    QTc Calculation (Bazett) 516 ms    P Axis 47 degrees    R Axis 49 degrees    T Axis 40 degrees       Radiology/Imaging:  XR CHEST PORTABLE   Final Result   1. New central venous catheter overlying IVC and right atrium. Endotracheal   and enteric tubes remain in place. 2.  No acute intrathoracic process.          CT CHEST PULMONARY EMBOLISM W CONTRAST   Final Result   No evidence of pulmonary embolism. Mild airspace disease within the right upper lobe posteriorly as well as the   posterior dependent portions of both lung bases. These changes may represent   atelectasis versus mild contusion secondary to prolonged chest compressions. RECOMMENDATIONS:   Unavailable         CT ABDOMEN PELVIS W IV CONTRAST Additional Contrast? None   Final Result   Diffuse dilation of the small and large bowel likely representing a moderate   ileus. Moderate-sized fecalith is identified within the rectal vault. Bibasilar infiltrates greater on the left likely representing dependent   atelectasis. Pulmonary contusion is considered less likely noting history of   extended chest compressions for cardiac arrest.      RECOMMENDATIONS:   Unavailable         CT Head WO Contrast   Final Result   No acute intracranial abnormality. XR CHEST PORTABLE   Final Result   No acute process.   Endotracheal tube located 4.4 cm above the diana             ASSESSMENT:     Patient Active Problem List    Diagnosis Date Noted    Cardiac arrest ([facility] [address] 02/15/2022        PLAN:      WEAN PER PROTOCOL:  []   No  [x]   Yes []   N/A                         ICU PROPHYLAXIS:                Stress ulcer:  []   PPI Agent [x]   V2Sjswl []   Sucralfate []   Other []   None      VTE:  [x]   Enoxaparin []   SQ Heparin []   EPC Cuffs []  Other                       NUTRITION:   []  NPO []   TF []   TPN [x]   PO                       HOME MEDS RECONCILED:  []   No  [x]   Yes                           CONSULTATION NEEDED:  []   No  [x]   Yes                           FAMILY UPDATED:  [x]   No  []   Yes                           TRANSFER OUT OF ICU:  []   No  [x]   Yes             PLAN/MEDICAL DECISION MAKING:  Neurologic/Psych:   · Neuro intact  · Neuro checks per protocol  · Off sedation  · Reportedly swallowed crack cocaine in the suicide attempt  · Suicide precautions, sitter at bedside  · Psych consulted, appreciate recommendations  · Medically cleared for transfer to [facility]  Cardiovascular:  · Hemodynamically stable  · Heart rate   · Concern for type II NSTEMI  · Echo performed yesterday showing EF of 60%, normal function  · Heparin drip stopped  · Aspirin 81 mg daily  Pulmonary:  · Maintain oxygen sats >92%  · Room air  GI/Nutrition  · GlycoLax as needed  · Ulcer Prophylaxis: H2 blockers   · Regular diet  Renal/Fluid/Electrolyte  · IV Fluids: 0.9NS @ 100 mL/Hr   · I/O: In: 1701.2 [I.V.:1701.2]  · Out: 2885 [Urine:2885]  · UOP: 1.5 cc/kg/hr  · Monitor electrolytes, replace PRN   ID  WBC:   Lab Results   Component Value Date    WBC 12.7 (H) 2022   · Stable  · Tmax: Temp (24hrs), Av.4 °F (38 °C), Min:99.7 °F (37.6 °C), Max:100.8 °F (38.2 °C)  ·   · Antimicrobials: not indicated   Hematology:  Recent Labs     22  0539 22  0552 22  0524   HGB 12.0 11.3* 11.6*   ·  stable  Endocrine:   glucose controlled - most recent BGL is   Recent Labs     22  0846 22  0552 22  0524   GLUCOSE 79 93 91   ·   DVT Prophylaxis  · lovenox  Discharge Needs:  PT, OT, ST, SW and Case Management      CODE STATUS: Full Code      DISPOSITION:  [] To remain ICU  [x] OK for out of ICU from [address] [place] [address]  Emergency Medicine Resident  [address]  2022, 7:09 AM EST

## 2022-02-18 NOTE — CONSULTS
Department of Psychiatry  [facility] 96:   Suicide attempt by overdose  CONSULTING PHYSICIAN: Dr. Barbara Lewis. History obtained from: Patient and chart    HISTORY OF PRESENT ILLNESS:    The patient is a 19 y.o. female with significant past psychiatric history of substance use disorder who presents with a seizure followed by a cardiac arrest following an overdose of crack cocaine. It is documented that the patient was brought to the hospital by the EMS from the correctional facility. The patient was hypotensive and was intubated and sedated. Seen using telehealth. -The patient claims to have no recollection of the circumstances of her admission. The patient states she remembers waking up in the hospital and other remember what was going on before that. She does not recall being in a correctional facility, in an ambulance or taking cocaine.  -The patient reports that her anxiety is occasionally high but generally it is manageable. She denies any history of panic attacks. -Mood is good. Experiences occasional sadness but denies persistent depressive symptoms.   -The patient was a little hoarse. She was able to articulate her answers. The patient was coherent in her speech. There is no evidence of thought disorder. The patient is oriented to time place and person. The patient denies any auditory or visual hallucinations. He denies any psychotic phenomena. The patient denies any paranoia or delusions. The patient    -We discussed the patient circumstances of admission. She realizes that they are serious. She continues to claim no recollection of them. The patient is not currently receiving care for the above psychiatric illness.       Psychiatric Review of Systems        ·    Obsessions and Compulsions: Denies    ·    Darlyn or Hypomania: Denies  ·    Hallucinations: Denies  ·    Panic Attacks:  Denies  ·    Delusions:  Denies  ·    Phobias:  Denies  ·    Trauma: Denies      Substance Abuse History:  ETOH: none  Marijuana: none  Opiates: none  Other Drugs: denies  Unspecified questioning the patient admits to remote cocaine use but does not recall using recently. Past Psychiatric History:  Prior Diagnosis: Unknown    Hospitalization: yes- as a child. Hx of Suicidal Attempts: no  Hx of violence:  no  Doesn't recall any previous treatment. Personal History: born in [address] [facility]. Good childhood. Finished 3 years of college. Lives in [place] with a friend. 1 child- 2yo who is is patient's mother. Unemployed. Past Medical History:    No past medical history on file. Past Surgical History:    No past surgical history on file. Medications Prior to Admission:   No medications prior to admission. Allergies:  Patient has no known allergies. FAMILY/SOCIAL HISTORY:  No family history on file. Social History     Socioeconomic History    Marital status: Single     Spouse name: Not on file    Number of children: Not on file    Years of education: Not on file    Highest education level: Not on file   Occupational History    Not on file   Tobacco Use    Smoking status: Not on file    Smokeless tobacco: Not on file   Substance and Sexual Activity    Alcohol use: Not on file    Drug use: Not on file    Sexual activity: Not on file   Other Topics Concern    Not on file   Social History Narrative    Not on file     Social Determinants of Health     Financial Resource Strain:     Difficulty of Paying Living Expenses: Not on file   Food Insecurity:     Worried About Running Out of Food in the Last Year: Not on file    Luci of Food in the Last Year: Not on file   Transportation Needs:     Lack of Transportation (Medical): Not on file    Lack of Transportation (Non-Medical):  Not on file   Physical Activity:     Days of Exercise per Week: Not on file    Minutes of Exercise per Session: Not on file   Stress:     Feeling of Stress : Not on file Social Connections:     Frequency of Communication with Friends and Family: Not on file    Frequency of Social Gatherings with Friends and Family: Not on file    Attends Mormonism Services: Not on file    Active Member of Clubs or Organizations: Not on file    Attends Club or Organization Meetings: Not on file    Marital Status: Not on file   Intimate Partner Violence:     Fear of Current or Ex-Partner: Not on file    Emotionally Abused: Not on file    Physically Abused: Not on file    Sexually Abused: Not on file   Housing Stability:     Unable to Pay for Housing in the Last Year: Not on file    Number of [place] in the Last Year: Not on file    Unstable Housing in the Last Year: Not on file       REVIEW OF SYSTEMS    Constitutional: [] fever  [] chills  [] weight loss  []weakness [] Other:  Eyes:  [] photophobia  [] discharge [] acuity change   [] Diplopia   [] Other:  HENT:  [] sore throat  [] ear pain [] Tinnitus   [] Other  Respiratory:  [] Cough  [] Shortness of breath   [] Sputum   [] Other:   Cardiac: []Chest pain   []Palpitations []Edema  []PND  [] Other:  GI:  []Abdominal pain   []Nausea  []Vomiting  []Diarrhea  [] Other:  :  [] Dysuria   []Frequency  []Hematuria  []Discharge  [] Other:  Possible Pregnancy: []Yes   []No   LMP:   Musculoskeletal:  []Back pain  []Neck pain  []Recent Injury   Skin:  []Rash  [] Itching  [] Other:  Neurologic:  [] Headache  [] Focal weakness  [] Sensory changes []Other:  Endocrine:  [] Polyuria  [] Polydipsia  [] Hair Loss  [] Other:  Lymphatic:   [] Swollen glands   Psychiatric:  As per HPI      All other systems negative except as marked or mentioned/indicated in the HPI. Rometta Favre      PHYSICAL EXAM:  Vitals:  /80   Pulse 95   Temp 98.1 °F (36.7 °C) (Oral)   Resp 16   Ht 5' 11\" (1.803 m)   Wt 180 lb (81.6 kg)   SpO2 97%   BMI 25.10 kg/m²      Neuro Exam:     Involuntary Movements: No    Mental Status Examination:    Level of consciousness:  within normal limits   Appearance:  hospital attire  Behavior/Motor:  no abnormalities noted  Attitude toward examiner:  cooperative and attentive  Speech:  spontaneous, normal rate and normal volume   Mood: anxious and constricted  Affect:  mood congruent  Thought processes:  linear, goal directed and coherent   Thought content:  Suicidal Ideation:  denies suicidal ideation  Delusions:  no evidence of delusions  Perceptual Disturbance:  denies any perceptual disturbance  Cognition:  oriented to person, place, and time   Concentration intact  Memory impaired recent memory and remote memory  Insight poor   Judgement poor   Fund of Knowledge adequate        LABS: REVIEWED TODAY:  Recent Labs     02/16/22  0539 02/17/22  0552 02/18/22  0524   WBC 16.7* 12.4* 12.7*   HGB 12.0 11.3* 11.6*    223 223     Recent Labs     02/16/22  0846 02/17/22  0552 02/18/22  0524   * 137 136   K 4.2 3.9 3.5*   * 109* 108*   CO2 18* 17* 16*   BUN 12 9 6   CREATININE 0.97* 0.67 0.57   GLUCOSE 79 93 91     Recent Labs     02/16/22  0846   BILITOT 0.23*   ALKPHOS 58   *   *     Lab Results   Component Value Date    BARBSCNU NEGATIVE 02/15/2022    LABBENZ NEGATIVE 02/15/2022    LABMETH NEGATIVE 02/15/2022    PPXUR NOT REPORTED 02/15/2022     Lab Results   Component Value Date    TSH 1.61 10/29/2013     No results found for: LITHIUM  No results found for: VALPROATE, CBMZ  No results found for: LITHIUM, VALPROATE    FURTHER LABS ORDERED :      Radiology   ECHO Complete 2D W Doppler W Color    Result Date: 2/17/2022  Transthoracic Echocardiography Report (TTE)  Patient Name TATY       Date of Study               02/17/2022               Sonam Ye   Date of      2000  Gender                      Female  Birth   Age          19 year(s)  Race                           Room Number  0116        Height:                     71 inch, 180.34 cm   Corporate ID [id number]    Weight:                     180 pounds, 81.6 kg  #   Patient Acct [de-identified]   BSA:          2.02 m^2      BMI:      25.11  #                                                              kg/m^2   MR #         [id number]     Sonographer                 Matt Ko   Accession #  [id number]  Interpreting Physician      Dawna Mitchell   Fellow                   Referring Nurse                           Practitioner   Interpreting             Referring Physician         [address]  Fellow  Type of Study   TTE procedure:2D Echocardiogram, M-Mode, Doppler, Color Doppler. Procedure Date Date: 02/17/2022 Start: 10:41 AM Study Location: [facility] Technical Quality: Fair visualization Indications:Elevated troponin and Cardiac arrest. History / Tech. Comments: Procedure explained to patient. Study done at the bedside in MICU. Patient Status: Inpatient Height: 71 inches Weight: 180 pounds BSA: 2.02 m^2 BMI: 25.11 kg/m^2 CONCLUSIONS Summary Global left ventricular systolic function is normal. Estimated ejection fraction is 60 % . Calculated EF via Jaramillo's method is 61 %. Normal left ventricular wall thickness. No significant valvular regurgitation or stenosis seen. Signature ----------------------------------------------------------------------------  Electronically signed by Carlene Rodriguez(Sonographer) on 02/17/2022  12:10 PM ---------------------------------------------------------------------------- ----------------------------------------------------------------------------  Electronically signed by Dawna Mitchell(Interpreting physician) on  02/17/2022 01:37 PM ---------------------------------------------------------------------------- FINDINGS Left Atrium Left atrium is normal in size. Left Ventricle Left ventricle is normal in size. Global left ventricular systolic function is normal. Estimated ejection fraction is 60 % . Calculated EF via Jaramillo's method is 61 %. Normal left ventricular wall thickness.  Right Atrium Right atrium is normal in size. Right Ventricle Normal right ventricular size and function. Mitral Valve Normal mitral valve structure. No significant mitral regurgitation. No mitral stenosis. Aortic Valve Aortic valve structure and function normal. Aortic valve is trileaflet. No aortic insufficiency. No aortic stenosis. Tricuspid Valve Normal tricuspid valve leaflets. No tricuspid regurgitation. No tricuspid stenosis. Insignificant tricuspid regurgitation, unable to estimate RVSP. Pulmonic Valve Pulmonic valve is normal in structure and function. No pulmonic insufficiency. No evidence of pulmonic stenosis. Pericardial Effusion No pericardial effusion. Miscellaneous Normal aortic root dimension. The ascending aorta is normal in size. E/E' average = 7.55. IVC normal diameter & inspiratory collapse indicating normal RA filling pressure .  M-mode / 2D Measurements & Calculations:   LVIDd:5 cm(3.7 - 5.6 cm)         Diastolic KLZFMW:555.47 ml  LVIDs:3.7 cm(2.2 - 4.0 cm)       Systolic XYRLBR:45.85 ml  IVSd:0.8 cm(0.6 - 1.1 cm)        Aortic Root:3.5 cm(2.0 - 3.7 cm)  LVPWd:0.8 cm(0.6 - 1.1 cm)       LA Dimension: 2.9 cm(1.9 - 4.0 cm)  Fractional [place] %       LA volume/Index: 40.6 ml /20m^2  Calculated LVEF (%): 62.51 %     LVOT:1.9 cm                                   RVDd:3.5 cm   Mitral:                                 Aortic   Valve Area (P1/2-Time): 3.49 cm^2       Peak Velocity: 1.66 m/s  Peak E-Wave: 1.01 m/s                   Mean Velocity: 1.06 m/s  Peak A-Wave: 0.90 m/s                   Peak Gradient: 11.02 mmHg  E/A Ratio: 1.12                         Mean Gradient: 6 mmHg  Peak Gradient: 4.08 mmHg  Mean Gradient: 3 mmHg  Deceleration Time: 246 msec             Area (continuity): 1.96 cm^2  P1/2t: 63 msec                          AV VTI: 28.6 cm   Area (continuity): 1.87 cm^2  Mean Velocity: 0.80 m/s                                           Pulmonic:                                           Peak Velocity: 1.19 m/s Peak Gradient: 5.66 mmHg  Diastology / Tissue Doppler Septal Wall E' velocity:0.13 m/s Septal Wall E/E':7.7 Lateral Wall E' velocity:0.14 m/s Lateral Wall E/E':7.4    CT Head WO Contrast    Result Date: 2/15/2022  EXAMINATION: CT OF THE HEAD WITHOUT CONTRAST  2/15/2022 4:08 am TECHNIQUE: CT of the head was performed without the administration of intravenous contrast. Dose modulation, iterative reconstruction, and/or weight based adjustment of the mA/kV was utilized to reduce the radiation dose to as low as reasonably achievable. COMPARISON: None. HISTORY: ORDERING SYSTEM PROVIDED HISTORY: cariac arrest pupils unequal TECHNOLOGIST PROVIDED HISTORY: cariac arrest pupils unequal Decision Support Exception - unselect if not a suspected or confirmed emergency medical condition->Emergency Medical Condition (MA) Is the patient pregnant?->No Reason for Exam: cariac arrest pupils unequal FINDINGS: BRAIN/VENTRICLES: There is no acute intracranial hemorrhage, mass effect or midline shift. No abnormal extra-axial fluid collection. The gray-white differentiation is maintained without evidence of an acute infarct. There is no evidence of hydrocephalus. ORBITS: The visualized portion of the orbits demonstrate no acute abnormality. SINUSES: The visualized paranasal sinuses and mastoid air cells demonstrate no acute abnormality. SOFT TISSUES/SKULL:  No acute abnormality of the visualized skull or soft tissues. No acute intracranial abnormality. CT ABDOMEN PELVIS W IV CONTRAST Additional Contrast? None    Result Date: 2/15/2022  EXAMINATION: CT OF THE ABDOMEN AND PELVIS WITH CONTRAST 2/15/2022 4:09 am TECHNIQUE: CT of the abdomen and pelvis was performed with the administration of intravenous contrast. Multiplanar reformatted images are provided for review.  Dose modulation, iterative reconstruction, and/or weight based adjustment of the mA/kV was utilized to reduce the radiation dose to as low as reasonably achievable. COMPARISON: None HISTORY: ORDERING SYSTEM PROVIDED HISTORY: post cardiac arrest, 20 minutes compression, ams, swallowed crack prior to arrest TECHNOLOGIST PROVIDED HISTORY: post cardiac arrest, 20 minutes compression, ams, swallowed crack prior to arrest Reason for Exam: cardiac arrest pupils unequal FINDINGS: Lower Chest: Bibasilar infiltrates are identified greater on the left likely representing atelectasis. Organs: The solid organs are unremarkable. The gallbladder is intact. No hydronephrosis is appreciated. GI/Bowel: There is diffuse dilation of the small and large bowel. The bowel loops are fluid-filled. The distal colon contains moderate amount of stool. These changes are compatible with moderate ileus. Moderate-sized fecalith is identified within the rectal vault. Pelvis: The urinary bladder is collapsed. Lees catheter is noted. Peritoneum/Retroperitoneum: No lymphadenopathy is detected. The aorta is intact. Bones/Soft Tissues: No acute osseous abnormality is identified. Diffuse dilation of the small and large bowel likely representing a moderate ileus. Moderate-sized fecalith is identified within the rectal vault. Bibasilar infiltrates greater on the left likely representing dependent atelectasis. Pulmonary contusion is considered less likely noting history of extended chest compressions for cardiac arrest. RECOMMENDATIONS: Unavailable     XR CHEST PORTABLE    Result Date: 2/16/2022  EXAMINATION: ONE XRAY VIEW OF THE CHEST 2/16/2022 6:16 am COMPARISON: 2/15/2022 HISTORY: ORDERING SYSTEM PROVIDED HISTORY: follow up cxr TECHNOLOGIST PROVIDED HISTORY: Follow up cxr Reason for Exam: supine port FINDINGS: Endotracheal tube and enteric tube remain in place. There is a central venous catheter overlying intrahepatic IVC and right atrium. Heart size is stable. No significant vascular congestion. No focal airspace consolidation, pneumothorax, or pleural effusion.   No free air beneath the diaphragm. 1.  New central venous catheter overlying IVC and right atrium. Endotracheal and enteric tubes remain in place. 2.  No acute intrathoracic process. XR CHEST PORTABLE    Result Date: 2/15/2022  EXAMINATION: ONE XRAY VIEW OF THE CHEST 2/15/2022 3:43 am COMPARISON: None. HISTORY: ORDERING SYSTEM PROVIDED HISTORY: post arrest et tube placmeent TECHNOLOGIST PROVIDED HISTORY: post arrest et tube placmeent Reason for Exam: et tube placement  post cardiac arrest  supine port FINDINGS: Endotracheal tube is located 4.4 cm above the diana. Nasogastric tube traverses the diaphragm. The lungs and pleural spaces are without acute focal process. The cardiomediastinal silhouette is without acute process. There is no evidence of pneumothorax. The osseous structures are without acute process. No acute process. Endotracheal tube located 4.4 cm above the diana     CT CHEST PULMONARY EMBOLISM W CONTRAST    Result Date: 2/15/2022  EXAMINATION: CTA OF THE CHEST 2/15/2022 4:09 am TECHNIQUE: CTA of the chest was performed after the administration of intravenous contrast.  Multiplanar reformatted images are provided for review. MIP images are provided for review. Dose modulation, iterative reconstruction, and/or weight based adjustment of the mA/kV was utilized to reduce the radiation dose to as low as reasonably achievable. COMPARISON: None HISTORY: ORDERING SYSTEM PROVIDED HISTORY: post cardiac arrest, 20 minutes compression, ams, swallowed crack prior to arrest TECHNOLOGIST PROVIDED HISTORY: post cardiac arrest, 20 minutes compression, ams, swallowed crack prior to arrest Reason for Exam: cariac arrest pupils unequal FINDINGS: Pulmonary Arteries: Pulmonary arteries are adequately opacified for evaluation. No evidence of intraluminal filling defect to suggest pulmonary embolism. Main pulmonary artery is normal in caliber. Mediastinum: No evidence of mediastinal lymphadenopathy.   The heart and pericardium demonstrate no acute abnormality. There is no acute abnormality of the thoracic aorta. Lungs/pleura: Small areas of airspace disease identified within the posterior aspect of the right upper lobe as well as the dependent portion of both lower lobes. These changes may represent combination of contusion and/or atelectasis. Patient with history of chest compressions. Upper Abdomen: Limited images of the upper abdomen are unremarkable. Soft Tissues/Bones: No acute bone or soft tissue abnormality. Endotracheal tube and nasogastric tube are identified. No evidence of pulmonary embolism. Mild airspace disease within the right upper lobe posteriorly as well as the posterior dependent portions of both lung bases. These changes may represent atelectasis versus mild contusion secondary to prolonged chest compressions. RECOMMENDATIONS: Unavailable       EKG: Noted QTC of 516 ms on 2/15/2022      DIAGNOSIS:     Depression, unspecified  Suicide attempt      RISK ASSESSMENT: Moderate risk of suicide      RECOMMENDATIONS-admit to psychiatry once medically cleared    Risk Management:  1:1 sitter    Medications: No psychotropics ordered at this time awaiting medical clearance  Discussed with the treating physician/ team about the patient and treatment plan  Reviewed the chart    Discussed with the patient risk, benefit, alternative and common side effects for the  proposed medication treatment. Patient is consenting to the treatment. Thanks for the consult. Please call me if needed. Dannielle Hernandez is a 19 y.o. female being evaluated by a Virtual Visit (video visit) encounter to address concerns as mentioned above. A caregiver was present in the room along with the patient.  Pursuant to the emergency declaration under the [facility], [address] [place] [address] authority and the [employer], this Virtual Visit was conducted with patient's (and/or legal guardian's) consent, to reduce the patient's risk of exposure to COVID-19 and provide necessary medical care. Services were provided through a video synchronous discussion virtually to substitute for in-person visit by provider. Patient is present at [place]  and I am physically present at my office in [facility]     --Karen Berg MD on 2/18/2022 at 3:39 PM    An electronic signature was used to authenticate this note. **This report has been created using voice recognition software. It may contain minor errors which are inherent in voice recognition technology. **

## 2022-02-18 NOTE — PLAN OF CARE
Problem: Non-Violent Restraints  Goal: Removal from restraints as soon as assessed to be safe  2/17/2022 0827 by Lara Mcnair RN  Outcome: Completed  Goal: No harm/injury to patient while restraints in use  2/17/2022 0827 by Lara Mcnair RN  Outcome: Completed  Goal: Patient's dignity will be maintained  2/17/2022 0827 by Lara Mcnair RN  Outcome: Completed     Problem: OXYGENATION/RESPIRATORY FUNCTION  Goal: Patient will maintain patent airway  Outcome: Ongoing  Goal: Patient will achieve/maintain normal respiratory rate/effort  Description: Respiratory rate and effort will be within normal limits for the patient  Outcome: Ongoing     Problem: MECHANICAL VENTILATION  Goal: Patient will maintain patent airway  Outcome: Ongoing  Goal: Oral health is maintained or improved  Outcome: Ongoing  Goal: ET tube will be managed safely  Outcome: Ongoing  Goal: Ability to express needs and understand communication  Outcome: Ongoing  Goal: Mobility/activity is maintained at optimum level for patient  Outcome: Ongoing     Problem: SKIN INTEGRITY  Goal: Skin integrity is maintained or improved  Outcome: Ongoing     Problem: Nutrition  Goal: Optimal nutrition therapy  Outcome: Ongoing     Problem: Falls - Risk of:  Goal: Will remain free from falls  Description: Will remain free from falls  Outcome: Ongoing  Goal: Absence of physical injury  Description: Absence of physical injury  Outcome: Ongoing     Problem: Skin Integrity:  Goal: Will show no infection signs and symptoms  Description: Will show no infection signs and symptoms  Outcome: Ongoing  Goal: Absence of new skin breakdown  Description: Absence of new skin breakdown  Outcome: Ongoing

## 2022-02-18 NOTE — PROGRESS NOTES
Report called to [facility] RN. All questions answered, no further concerns. Will transport patient when bed is clean.

## 2022-02-18 NOTE — DISCHARGE INSTR - COC
Continuity of Care Form    Patient Name: Dannielle Hernandez   :  2000  MRN:  [id number]    Admit date:  2/15/2022  Discharge date:  ***    Code Status Order: Full Code   Advance Directives:      Admitting Physician:  Chiqui Crump MD  PCP: No primary care provider on file. Discharging Nurse: [facility] Unit/Room#: 0116/0116-01  Discharging Unit Phone Number: ***    Emergency Contact:   Extended Emergency Contact Information  Primary Emergency Contact: Kathy Mendez  Address: [address] [place] [address]  Home Phone: [phone]  Mobile Phone: [phone]  Relation: Parent  Secondary Emergency Contact: Matthew Minaya  Mobile Phone: [phone]  Relation: Parent    Past Surgical History:  No past surgical history on file. Immunization History: There is no immunization history on file for this patient.     Active Problems:  Patient Active Problem List   Diagnosis Code    Cardiac arrest ([facility] [address] I46.9       Isolation/Infection:   Isolation            No Isolation          Patient Infection Status       None to display            Nurse Assessment:  Last Vital Signs: /80   Pulse 95   Temp 98.1 °F (36.7 °C) (Oral)   Resp 16   Ht 5' 11\" (1.803 m)   Wt 180 lb (81.6 kg)   SpO2 97%   BMI 25.10 kg/m²     Last documented pain score (0-10 scale): Pain Level: 0  Last Weight:   Wt Readings from Last 1 Encounters:   02/15/22 180 lb (81.6 kg)     Mental Status:  {IP PT MENTAL STATUS:25333}    IV Access:  { JAX IV ACCESS:205033055}    Nursing Mobility/ADLs:  Walking   {CHP DME LFOB:634509482}  Transfer  {CHP DME ZEAS:519422330}  Bathing  {CHP DME ULHS:426375611}  Dressing  {CHP DME TBPA:114864348}  Toileting  {CHP DME XDZX:267800319}  Feeding  {CHP DME VYMB:784727853}  Med Admin  {CHP DME OJXP:557541656}  Med Delivery   { JAX MED Delivery:444329535}    Wound Care Documentation and Therapy:  Wound 02/15/22 Finger (Comment which one) Anterior;Right (Active)   Wound Etiology Other 22 0400   Dressing Status Other (Comment) 22 0400   Wound Cleansed Cleansed with saline 02/15/22 0700   Dressing/Treatment Open to air 22 0800   Wound Assessment Purple/maroon 22 0800   Drainage Amount None 22 0400   Drainage Description Sanguinous 02/15/22 0700   Odor None 22 0400   Marilin-wound Assessment Intact 22 0400   Number of days: 3        Elimination:  Continence: Bowel: {YES / OT:65241}  Bladder: {YES / NQ:60288}  Urinary Catheter: {Urinary Catheter:612322349}   Colostomy/Ileostomy/Ileal Conduit: {YES / BILLS:12904}       Date of Last BM: ***    Intake/Output Summary (Last 24 hours) at 2022 1544  Last data filed at 2022 0800  Gross per 24 hour   Intake 1335.27 ml   Output 2070 ml   Net -734.73 ml     I/O last 3 completed shifts:   In: 3862 [I.V.:3440; NG/GT:422]  Out: 4195 [Urine:4195]    Safety Concerns:     [address] Safety Concerns:815703528}    Impairments/Disabilities:      [address] Impairments/Disabilities:745148521}    Nutrition Therapy:  Current Nutrition Therapy:   [address] Diet List:441650805}    Routes of Feeding: {CHP DME Other Feedings:938343881}  Liquids: {Slp liquid thickness:08205}  Daily Fluid Restriction: {CHP DME Yes amt example:619055658}  Last Modified Barium Swallow with Video (Video Swallowing Test): {Done Not Done YAFL:242919452}    Treatments at the Time of Hospital Discharge:   Respiratory Treatments: ***  Oxygen Therapy:  {Therapy; copd oxygen:54160}  Ventilator:    { CC Vent PHBL:510194059}    Rehab Therapies: {THERAPEUTIC INTERVENTION:4131130388}  Weight Bearing Status/Restrictions: [address]  Weight Bearin}  Other Medical Equipment (for information only, NOT a DME order):  {EQUIPMENT:091312778}  Other Treatments: ***    Patient's personal belongings (please select all that are sent with patient):  {CHP DME Belongings:709424715}    RN SIGNATURE:  {Esignature:270017269}    CASE MANAGEMENT/SOCIAL WORK SECTION    Inpatient Status Date: ***    Readmission Risk Assessment Score:  Readmission Risk              Risk of Unplanned Readmission:  13           Discharging to Facility/ Agency   Name:   Address:  Phone:  Fax:    Dialysis Facility (if applicable)   Name:  Address:  Dialysis Schedule:  Phone:  Fax:    / signature: {Esignature:940237014}    PHYSICIAN SECTION    Prognosis: Fair    Condition at Discharge: Stable    Rehab Potential (if transferring to Rehab): Fair    Recommended Labs or Other Treatments After Discharge: None. Physician Certification: I certify the above information and transfer of Gloria Fernandez  is necessary for the continuing treatment of the diagnosis listed and that she requires BHI for greater 30 days.      Update Admission H&P: No change in H&P    PHYSICIAN SIGNATURE:  Electronically signed by Kai Leon MD on 2/18/22 at 3:45 PM EST

## 2022-02-18 NOTE — PLAN OF CARE
Nutrition Problem #1: Inadequate oral intake  Intervention: Food and/or Nutrient Delivery: Continue Current Diet  Nutritional Goals: meet % of estimated nutrient needs

## 2022-02-18 NOTE — PROGRESS NOTES
[address] [place] [address]     Department of Internal Medicine - Staff Internal Medicine Service   ICU PATIENT TRANSFER NOTE        Patient:  Alejandro Armenta  YOB: 2000  MRN: [id number]     Acct: [de-identified]     Admit date: 2/15/2022    Code Status:-  Full code     Reason for ICU Admission:-       SUPPORT DEVICES: [] Ventilator [] BIPAP  [] Nasal Cannula [x] Room Air    Consultations:- [x] Cardiology [] Nephrology  [] Hemo onco  [] GI                               [] ID [] ENT  [] Rheum [] Endo   []Physiotherapy                                 Others:-  Psychiatry    NUTRITION:  [] NPO [] Tube Feeding (Specify: ) [] TPN  [x] PO    Central Lines:- [x] No   [] Yes           If yes - Days/Date of Insertion. Pt seen,examined and Chart reviewed. ICU COURSE:    The patient is a 19 y.o. female with no significant past medical history who was initially admitted on 2/15/2022 with Cardiac arrest [facility]) [I46.9] after swallowing crack cocaine and was brought from a correctional facility. Patient does not remember any of the events at the correctional facility and she only remembers waking up in the hospital. Per EMR, patient became altered and [facility] and was also observed to have tonic-clonic movements on a security camera footage. After the seizure, patient had PEA arrest that required CPR and several rounds of epinephrine for about 20 minutes after which ROSC was achieved. In the ER, patient presented intubated, sedated and hypotensive on presentation. She was being initiated on therapeutic hypothermia however she followed verbal commands and hypothermia was discontinued. She was requiring vasopressor support and was sedated on fentanyl and Versed infusions. Cardiology was consulted. Patient also had PRATIMA and leukocytosis. She was also cocaine positive. In the ICU, patient remained intubated and sedated on low vent setting. Cardiology recommended elevated troponin likely type II NSTEMI and was started on heparin drip. Echo showed LVEF 60% and no significant valvular regurgitation or stenosis. Patient was extubated on 2/17/22 to nasal canula 2L. She tolerated extubation well. Psychiatry was consulted and recommended patient with moderate risk of suicide and recommended admission to psychiatry once medically clear. Currently, the patient is doing well, on room air saturating well. Vitals stable. She reported mild sore throat post extubation. She has been eating well post extubation, no concerns with urination or bowel movements. She denies shortness of breath, chest pain, palpitation, dizziness or dysuria. Physical Exam:   Vitals: BP (!) 115/56   Pulse 97   Temp 98.2 °F (36.8 °C) (Oral)   Resp 20   Ht 5' 11\" (1.803 m)   Wt 180 lb (81.6 kg)   SpO2 99%   BMI 25.10 kg/m²   24 hour intake/output:    Intake/Output Summary (Last 24 hours) at 2/18/2022 2016  Last data filed at 2/18/2022 1945  Gross per 24 hour   Intake 2092.89 ml   Output 1325 ml   Net 767.89 ml     Last 3 weights:   Wt Readings from Last 3 Encounters:   02/15/22 180 lb (81.6 kg)       General appearance - alert, in no distress  Mental status - alert, oriented to person, place, and time  Eyes - pupils equal and reactive, extraocular eye movements intact  Mouth - mucous membranes moist, pharynx normal without lesions  Neck - supple, no significant adenopathy  Chest - clear to auscultation, no wheezes  Heart - normal rate, regular rhythm, normal S1, S2  Abdomen - soft, nontender, nondistended  Neurological - alert, oriented, normal speech, no focal deficits   Extremities - peripheral pulses normal, no pedal edema  Skin - normal coloration and turgor, no rashes      Medications:Current Inpatient  Scheduled Meds:   enoxaparin  40 mg SubCUTAneous Daily    sodium chloride flush  5-40 mL IntraVENous 2 times per day    chlorhexidine  15 mL Mouth/Throat BID    aspirin  81 mg Oral Daily     Continuous Infusions:   sodium chloride      dextrose       PRN Meds:sodium chloride flush, sodium chloride, polyethylene glycol, acetaminophen **OR** acetaminophen, ondansetron **OR** ondansetron, glucose, dextrose, glucagon (rDNA), dextrose    Objective:    CBC:   Recent Labs     02/16/22  0539 02/17/22  0552 02/18/22  0524   WBC 16.7* 12.4* 12.7*   HGB 12.0 11.3* 11.6*    223 223     BMP:    Recent Labs     02/16/22  0846 02/17/22  0552 02/18/22  0524   * 137 136   K 4.2 3.9 3.5*   * 109* 108*   CO2 18* 17* 16*   BUN 12 9 6   CREATININE 0.97* 0.67 0.57   GLUCOSE 79 93 91     Calcium:  Recent Labs     02/18/22  0524   CALCIUM 8.1*     Ionized Calcium:No results for input(s): IONCA in the last 72 hours. Magnesium:  Recent Labs     02/18/22  0524   MG 1.7     Phosphorus:  Recent Labs     02/16/22  0846   PHOS 2.8     BNP:No results for input(s): BNP in the last 72 hours. Glucose:  Recent Labs     02/17/22  0045 02/17/22  0413 02/17/22 2114   POCGLU 85 96 207*     HgbA1C: No results for input(s): LABA1C in the last 72 hours. INR: No results for input(s): INR in the last 72 hours. Hepatic:   Recent Labs     02/16/22  0846   ALKPHOS 58   *   *   PROT 6.0*   BILITOT 0.23*   BILIDIR <0.08   LABALBU 3.4*     Amylase and Lipase:  Recent Labs     02/16/22  0846   LACTA NOT REPORTED     Lactic Acid:   Recent Labs     02/16/22  0846   LACTA NOT REPORTED     CARDIAC ENZYMES:No results for input(s): CKTOTAL, CKMB, CKMBINDEX, TROPONINI in the last 72 hours. BNP: No results for input(s): BNP in the last 72 hours. Lipids: No results for input(s): CHOL, TRIG, HDL, LDL, LDLCALC in the last 72 hours.   ABGs: No results found for: PH, PCO2, PO2, HCO3, O2SAT  Thyroid:   Lab Results   Component Value Date    TSH 1.61 10/29/2013        Urinalysis: No results for input(s): BACTERIA, BLOODU, CLARITYU, COLORU, PHUR, PROTEINU, RBCUA, SPECGRAV, BILIRUBINUR, NITRU, WBCUA, Yamil Pluck in the last 72 hours.        Assessment:  Principal Problem:    Cardiac arrest [facility])  Active Problems:    Depression  Resolved Problems:    * No resolved hospital problems. *      Plan:    S/p Cardiac arrest  - PEA arrest after ingesting crack cocaine at correction facility; ROSC achieved afetr 20 mins of CPR and multiple rounds of epinephrine  - Cardiology was following: Was on heparin gtt for NSTEMI Type-2  - Was intubated and mechanically ventilated in the field after cardiac arrest, extubated on 2/17  - No chest pain or shortness of breath currently; stable from cardiology standpoint  - Echo 2/15: LVEF 60%; no significant valvular regurgitation or stenosis seen    Depression  - Psychiatry consulted: Risk of suicide, recommended admission to psychiatry once medically clear. PRATIMA - Resolved  Cr 0.57    Diet: Regular  DVT PPX: Lovenox  GI PPX: not indicated    PT/OT: On board  Discharge Planning: Patient will be admitted to [facility] once medically cleared.      Ema Gonzalez MD , PGY-1       Department of Internal Medicine  [address] [place] [address]           2/18/2022, 8:16 PM

## 2022-02-18 NOTE — PROGRESS NOTES
Comprehensive Nutrition Assessment    Type and Reason for Visit:  Reassess    Nutrition Recommendations/Plan: Continue current diet; Encourage po intake as tolerated. Will continue to monitor tolerance to diet and adequacy of intake. Nutrition Assessment:  Chart reviewed. Pt was extubated yesterday. Tube Feeding discontinued. Currently on a Regular diet; noted pt consumed approx 50% of breakfast today. Pt sleeping at time of visit. Malnutrition Assessment:  Malnutrition Status: At risk for malnutrition  Context:  Acute Illness     Findings of the 6 clinical characteristics of malnutrition:  Energy Intake:  Mild decrease in energy intake  Weight Loss:  Unable to assess     Body Fat Loss:  No significant body fat loss     Muscle Mass Loss:  No significant muscle mass loss    Fluid Accumulation:  1 - Mild Extremities,Generalized   Strength:  Not Performed    Estimated Daily Nutrient Needs:  Energy (kcal):  2865-9817 kcal/day; Weight Used for Energy Requirements:  Ideal     Protein (g):   g pro/day; Weight Used for Protein Requirements:  Ideal (1.2-1.5)        Fluid (ml/day):  2100 mL/day; Method Used for Fluid Requirements:  ml/Kg (30)      Nutrition Related Findings:  meds/labs reviewed      Wounds:  None       Current Nutrition Therapies:    ADULT DIET; Regular    Additional Calorie Sources:   none    Anthropometric Measures:  · Height: 5' 11\" (180.3 cm)  · Current Body Weight: 180 lb (81.6 kg)   · Admission Body Weight: 180 lb (81.6 kg) (Stated)    · Ideal Body Weight: 155 lbs; % Ideal Body Weight 116.1 %   · BMI: 25.1  · BMI Categories: Overweight (BMI 25.0-29. 9)       Nutrition Diagnosis:   · Inadequate oral intake related to recent diet advancement as evidenced by , only one meal so far    Nutrition Interventions:   Food and/or Nutrient Delivery:  Continue Current Diet  Nutrition Education/Counseling:  No recommendation at this time   Coordination of Nutrition Care:  Continue to monitor while inpatient    Goals:  meet % of estimated nutrient needs -progressing towards goal       Nutrition Monitoring and Evaluation:   Behavioral-Environmental Outcomes:  None Identified   Food/Nutrient Intake Outcomes:  Diet Advancement/Tolerance,Food and Nutrient Intake  Physical Signs/Symptoms Outcomes:  Biochemical Data,Nutrition Focused Physical Findings,Skin,Weight     Discharge Planning:     Too soon to determine     Electronically signed by Maki Dear, RD, LD on 2/18/22 at 2:48 PM EST    Contact: [phone]

## 2022-02-18 NOTE — PLAN OF CARE
Problem: Nutrition  Goal: Optimal nutrition therapy  Outcome: Ongoing     Problem: Falls - Risk of:  Goal: Will remain free from falls  Description: Will remain free from falls  Outcome: Ongoing     Problem: Falls - Risk of:  Goal: Absence of physical injury  Description: Absence of physical injury  Outcome: Ongoing     Problem: Skin Integrity:  Goal: Will show no infection signs and symptoms  Description: Will show no infection signs and symptoms  Outcome: Ongoing     Problem: Skin Integrity:  Goal: Absence of new skin breakdown  Description: Absence of new skin breakdown  Outcome: Ongoing

## 2022-02-19 VITALS
OXYGEN SATURATION: 97 % | HEART RATE: 80 BPM | WEIGHT: 172.9 LBS | DIASTOLIC BLOOD PRESSURE: 87 MMHG | TEMPERATURE: 98.8 F | SYSTOLIC BLOOD PRESSURE: 127 MMHG | HEIGHT: 71 IN | BODY MASS INDEX: 24.21 KG/M2 | RESPIRATION RATE: 16 BRPM

## 2022-02-19 PROBLEM — R45.851 DEPRESSION WITH SUICIDAL IDEATION: Status: ACTIVE | Noted: 2022-02-19

## 2022-02-19 PROBLEM — F32.A DEPRESSION WITH SUICIDAL IDEATION: Status: ACTIVE | Noted: 2022-02-19

## 2022-02-19 LAB
SARS-COV-2, RAPID: NOT DETECTED
SPECIMEN DESCRIPTION: NORMAL

## 2022-02-19 PROCEDURE — 97161 PT EVAL LOW COMPLEX 20 MIN: CPT

## 2022-02-19 PROCEDURE — 93005 ELECTROCARDIOGRAM TRACING: CPT

## 2022-02-19 PROCEDURE — 6370000000 HC RX 637 (ALT 250 FOR IP): Performed by: STUDENT IN AN ORGANIZED HEALTH CARE EDUCATION/TRAINING PROGRAM

## 2022-02-19 PROCEDURE — 2580000003 HC RX 258: Performed by: GENERAL PRACTICE

## 2022-02-19 PROCEDURE — 93005 ELECTROCARDIOGRAM TRACING: CPT | Performed by: INTERNAL MEDICINE

## 2022-02-19 PROCEDURE — 99232 SBSQ HOSP IP/OBS MODERATE 35: CPT | Performed by: INTERNAL MEDICINE

## 2022-02-19 PROCEDURE — 97530 THERAPEUTIC ACTIVITIES: CPT

## 2022-02-19 PROCEDURE — 97535 SELF CARE MNGMENT TRAINING: CPT

## 2022-02-19 PROCEDURE — 1200000000 HC SEMI PRIVATE

## 2022-02-19 PROCEDURE — 87635 SARS-COV-2 COVID-19 AMP PRB: CPT

## 2022-02-19 PROCEDURE — 97165 OT EVAL LOW COMPLEX 30 MIN: CPT

## 2022-02-19 RX ORDER — POLYETHYLENE GLYCOL 3350 17 G/17G
17 POWDER, FOR SOLUTION ORAL DAILY PRN
Status: CANCELLED | OUTPATIENT
Start: 2022-02-19

## 2022-02-19 RX ADMIN — SODIUM CHLORIDE, PRESERVATIVE FREE 10 ML: 5 INJECTION INTRAVENOUS at 09:13

## 2022-02-19 RX ADMIN — ASPIRIN 81 MG: 81 TABLET, CHEWABLE ORAL at 09:08

## 2022-02-19 ASSESSMENT — PAIN SCALES - GENERAL
PAINLEVEL_OUTOF10: 0
PAINLEVEL_OUTOF10: 1
PAINLEVEL_OUTOF10: 0

## 2022-02-19 NOTE — PROGRESS NOTES
[facility] called patient report given to Wilber Oneill RN. Waiting  For ETA for transport from discharge planner.

## 2022-02-19 NOTE — PLAN OF CARE
Problem: OXYGENATION/RESPIRATORY FUNCTION  Goal: Patient will maintain patent airway  2/19/2022 1820 by Kathrine Avelar RN  Outcome: Ongoing  2/19/2022 0955 by Kathrine Avelar RN  Reactivated  Goal: Patient will achieve/maintain normal respiratory rate/effort  Description: Respiratory rate and effort will be within normal limits for the patient  2/19/2022 1820 by Kathrine Avelar RN  Outcome: Ongoing  2/19/2022 0955 by Kathrine Avelar RN  Reactivated     Problem: Suicide risk  Goal: Provide patient with safe environment  Description: Provide patient with safe environment  2/19/2022 1820 by Kathrine Avelar RN  Outcome: Ongoing  2/19/2022 0955 by Kathrine Avelar RN  Reactivated  2/19/2022 0955 by Kathrine Avelar RN  Outcome: Completed     Problem: Musculor/Skeletal Functional Status  Goal: Highest potential functional level  Outcome: Ongoing

## 2022-02-19 NOTE — CARE COORDINATION
Transition planning  PS internal med resident Dr. Do Mention for rapid covid test order as patient has been medically cleared for discharge to [facility]. Order placed, pt's RN Lisa Finley notified. 1206 Rapid covid is negative. Called [facility] to initiate [facility] transfer. Await bed availability. Faxed adeel voluntary admission form to [facility] at [phone].  [phone] Requested transport from [address] [place] [address] to [place] [address], room [facility].  [facility] Spoke with Viviana Smith at [address] [place] [address], she states first available  time is 11:30 pm after calling other transport companies also. Updated patient's RN and Viviana Smith at [place] [address] [phone] of p/u time.

## 2022-02-19 NOTE — PLAN OF CARE
Problem: Nutrition  Goal: Optimal nutrition therapy  2/18/2022 2353 by Therese Ann RN  Outcome: Ongoing  Note: Patient is able to feed self, c/o nausea on and off  2/18/2022 1452 by Tao Hi RD, LD  Outcome: Ongoing  Note: Nutrition Problem #1: Inadequate oral intake  Intervention: Food and/or Nutrient Delivery: Continue Current Diet  Nutritional Goals: meet % of estimated nutrient needs    2/18/2022 1132 by Girish Alvarado RN  Outcome: Ongoing     Problem: Falls - Risk of:  Goal: Will remain free from falls  Description: Will remain free from falls  2/18/2022 2353 by Therese Ann RN  Outcome: Ongoing  Note: No fall or injury  2/18/2022 1132 by Girish Alvarado RN  Outcome: Ongoing  Goal: Absence of physical injury  Description: Absence of physical injury  2/18/2022 2353 by Therese Ann RN  Outcome: Met This Shift  Note: No fall or injury in this shift  2/18/2022 1132 by Girish Alvarado RN  Outcome: Ongoing     Problem: Falls - Risk of:  Goal: Absence of physical injury  Description: Absence of physical injury  2/18/2022 2353 by Therese Ann RN  Outcome: Met This Shift  Note: No fall or injury in this shift  2/18/2022 1132 by Girish Alvarado RN  Outcome: Ongoing     Problem: Skin Integrity:  Goal: Will show no infection signs and symptoms  Description: Will show no infection signs and symptoms  2/18/2022 2353 by Therese Ann RN  Note: Skin intact.  No skin breakdown  2/18/2022 1132 by Girish Alvarado RN  Outcome: Ongoing  Goal: Absence of new skin breakdown  Description: Absence of new skin breakdown  2/18/2022 1132 by Girish Alvarado RN  Outcome: Ongoing     Problem: Skin Integrity:  Goal: Absence of new skin breakdown  Description: Absence of new skin breakdown  2/18/2022 1132 by Girish Alvarado RN  Outcome: Ongoing

## 2022-02-19 NOTE — PROGRESS NOTES
Occupational Therapy   Occupational Therapy Initial Assessment  Date: 2022   Patient Name: Dannielle Hernandez  MRN: [id number]     : 2000    Date of Service: 2022  Chief Complaint   Patient presents with    Cardiac Arrest     Discharge Recommendations:    No therapy recommended at discharge. Assessment   Assessment: Patient demonstrates ADLs at baseline level with no acute need identified this date. OT to defer further treatment at this time, please re-consult if functional deficits arise. Prognosis: Good  Decision Making: Low Complexity  Patient Education: OT role, OT POC, purpose of evaluation,  activity promotion - fair return  No Skilled OT: At baseline function  REQUIRES OT FOLLOW UP: No  Activity Tolerance  Activity Tolerance: Patient Tolerated treatment well  Safety Devices  Safety Devices in place: Yes  Type of devices: Gait belt;Call light within reach;Nurse notified; Left in bed  Restraints  Initially in place: No         Patient Diagnosis(es): The encounter diagnosis was Cardiac arrest [facility]). has no past medical history on file. has no past surgical history on file. Restrictions  Restrictions/Precautions  Restrictions/Precautions: Up as Tolerated    Subjective   General  Patient assessed for rehabilitation services?: Yes  Family / Caregiver Present: No  General Comment  Comments: RN ok'd patient for OT/PT evaluation. Pt agreeable and cooperative throughout. Sitter present throughout evaluation.   Patient Currently in Pain: Denies  Vital Signs  Patient Currently in Pain: Denies     Social/Functional History  Social/Functional History  Lives With: Family (Stays with her dad)  Type of Home: Apartment  Home Layout: One level  Home Access: Level entry  Bathroom Shower/Tub: Tub/Shower unit  Bathroom Toilet: Standard  Home Equipment:  (No DME)  ADL Assistance: [address]: Independent  Homemaking Responsibilities: Yes  Meal Prep Responsibility: Primary  Laundry Responsibility: Primary  Cleaning Responsibility: Primary  Shopping Responsibility: Primary  Ambulation Assistance: Independent  Transfer Assistance: Independent  Active : Yes  Mode of Transportation: Car  Occupation: Unemployed  Leisure & Hobbies: Likes to exercise     Objective   Vision: Within Functional Limits  Hearing: Within functional limits          Balance  Sitting Balance: Independent (EOB for ~3 min; toilet for ~2 unsupported)  Standing Balance: Independent  Standing Balance  Time: 4-5 min  Activity: toileting, dressing tasks  Comment: 0 LOB  Functional Mobility  Functional - Mobility Device: No device  Activity: Other; To/from bathroom (household/community distances)  Assist Level: Independent  Toilet Transfers  Toilet - Technique: Ambulating  Equipment Used: Standard toilet  Toilet Transfer: Independent  ADL  Feeding: Independent  Grooming: Independent  UE Bathing: Independent  UE Dressing: Independent  LE Dressing: Independent  Toileting: Independent  Additional Comments: Pt supine upon arrival, donned shirt at Mod I long sitting in bed. Pt provided with pants and donned independently EOB. Pt completed functional mobility to the bathroom and completed toileting independently. Pt engaged in light washing of the arms, neck and face seated on toilet independently. Pt independently demo'd doffing pants, undergarments and socks to don clean undergarments, place clean feminine hygiene napkin, pants and socks in standing position. Pt doffed pants at end of session d/t having string around waist.  Tone RUE  RUE Tone: Normotonic  Tone LUE  LUE Tone: Normotonic  Coordination  Movements Are Fluid And Coordinated: Yes     Bed mobility  Supine to Sit: Supervision  Sit to Supine: Independent  Scooting: Independent  Transfers  Sit to stand: Supervision  Stand to sit:  Independent     Cognition  Overall Cognitive Status: WFL        Sensation  Overall Sensation Status: WFL (Denies numbness/tingling)      LUE AROM : WFL  Left Hand AROM: WFL  RUE AROM : WFL  Right Hand AROM: WFL  LUE Strength  Gross LUE Strength: WFL  L Hand General: 4+/5  LUE Strength Comment: Grossly 4+/5  RUE Strength  Gross RUE Strength: WFL  R Hand General: 4+/5  RUE Strength Comment: Grossly 4+/5     Plan        AM-PAC Score        AM-[facility] Inpatient Daily Activity Raw Score: 24 (02/19/22 1355)  AM-PAC Inpatient ADL T-Scale Score : 57.54 (02/19/22 1355)  ADL Inpatient CMS 0-100% Score: 0 (02/19/22 1355)  ADL Inpatient CMS G-Code Modifier : CH (02/19/22 1355)    Goals        Therapy Time   Individual Concurrent Group Co-treatment   Time In 0921         Time Out 0936         Minutes 15         Timed Code Treatment Minutes: 8 Minutes     Gucci Leonard, OTR/L

## 2022-02-19 NOTE — PROGRESS NOTES
[facility] called and stated pt would be going to [id number] .  And to call report at [phone] and to have dc planner arrange transportation

## 2022-02-19 NOTE — PROGRESS NOTES
Physical Therapy    Facility/Department: [address] ORTHO/MED SURG  Initial Assessment    NAME: Karin Bales  : 2000  MRN: [id number]    Date of Service: 2022  Chief Complaint   Patient presents with    Cardiac Arrest     Discharge Recommendations:    No further therapy required at discharge. Assessment   Assessment: The pt ambulated 300 ft without a device x SBA. She had no c/o dizziness, pain , or fatigue with mobilization. No further PT intervention is needed at this time  Prognosis: Good  Decision Making: Medium Complexity  PT Education: Goals;PT Role;Plan of Care  REQUIRES PT FOLLOW UP: No  Activity Tolerance  Activity Tolerance: Patient Tolerated treatment well       Patient Diagnosis(es): The encounter diagnosis was Cardiac arrest ([facility] [address] has no past medical history on file. has no past surgical history on file.     Restrictions  Restrictions/Precautions  Restrictions/Precautions: Up as Tolerated  Vision/Hearing  Vision: Within Functional Limits  Hearing: Within functional limits     Subjective  General  Patient assessed for rehabilitation services?: Yes  Response To Previous Treatment: Not applicable  Family / Caregiver Present: Yes (sitter present)  Follows Commands: Within Functional Limits  Subjective  Subjective: RN and pt agreeable to PT eval  Pain Screening  Patient Currently in Pain: Denies  Pain Assessment  Pain Assessment: 0-10  Pain Level: 0  Vital Signs  Patient Currently in Pain: Denies     Orientation  Orientation  Overall Orientation Status: Within Normal Limits  Social/Functional History  Social/Functional History  Lives With: Family (Stays with her dad)  Type of Home: Apartment  Home Layout: One level  Home Access: Level entry  Bathroom Shower/Tub: Tub/Shower unit  Bathroom Toilet: Standard  Home Equipment:  (No DME)  ADL Assistance: Independent  Homemaking Assistance: Independent  Homemaking Responsibilities: Yes  Ambulation Assistance: Independent  Transfer Assistance: Independent  Active : Yes  Mode of Transportation: Car  Occupation: Unemployed  Leisure & Hobbies: Likes to exercise  Cognition      Objective     AROM RLE (degrees)  RLE AROM: WNL  AROM LLE (degrees)  LLE AROM : WNL  AROM RUE (degrees)  RUE AROM : WNL  AROM LUE (degrees)  LUE AROM : WNL  Strength RLE  Strength RLE: WNL  Strength LLE  Strength LLE: WNL  Strength RUE  Strength RUE: WNL  Strength LUE  Strength LUE: WNL     Sensation  Overall Sensation Status: WFL (Denies numbness/tingling)  Bed mobility  Rolling to Right: Stand by assistance  Supine to Sit: Stand by assistance  Sit to Supine: Stand by assistance  Scooting: Stand by assistance  Transfers  Sit to Stand: Stand by assistance  Stand to sit: Stand by assistance  Ambulation  Ambulation?: Yes  Ambulation 1  Surface: level tile  Device: No Device  Assistance: Stand by assistance  Distance: amb 300 ft without a device x SBA     Balance  Posture: Good  Sitting - Static: Good  Sitting - Dynamic: Good  Standing - Static: Good  Standing - Dynamic: Good      Plan   Plan  Times per week: DC   PT  Safety Devices  Type of devices: Nurse notified,Left in bed,Call light within reach,Sitter present    G-Code     OutComes Score     AM-PAC Score  AM-PAC Inpatient Mobility Raw Score : 21 (02/19/22 1104)  AM-PAC Inpatient T-Scale Score : 50.25 (02/19/22 1104)  Mobility Inpatient CMS 0-100% Score: 28.97 (02/19/22 1104)  Mobility Inpatient CMS G-Code Modifier : CJ (02/19/22 1104)        Goals  Short term goals  Time Frame for Short term goals: No PT needs at this [facility]      DC   PT       Therapy Time   Individual Concurrent Group Co-treatment   Time In [facility]         Time Out 0950         Minutes [address] [facility] Manuel Fountain, PT

## 2022-02-19 NOTE — PROGRESS NOTES
[facility]  Internal Medicine Teaching Residency Program  Inpatient Daily Progress Note  ______________________________________________________________________________    Patient: Darion Frank  YOB: 2000   OTL:[id number]    Acct: [de-identified]     Room: [facility]  Admit date: 2/15/2022  Today's date: 02/19/22  Number of days in the hospital: 4    SUBJECTIVE   Admitting Diagnosis: Cardiac arrest [facility])  CC: s/p cardiac arrest     Pt examined at bedside. Chart & results reviewed. No acute events overnight. Patient remained afebrile and hemodynamically stable. /56; HR 97; RR 20; SPO2 99% on room air.  -Patient refused labs this morning. BMP and CBC stable yesterday. She had an episode of vomiting yesterday after eating large amount of food and nausea this am after eating breakfast. ECG was prolonged at QTc 516 on last ECG,. Repeat ECG this am. She is urinating well and BM okay. -Patient is medically clear for discharge to [facility].     ROS:  Constitutional:  negative for chills, fevers, sweats  Respiratory:  negative for cough, dyspnea on exertion, hemoptysis, shortness of breath, wheezing  Cardiovascular:  negative for chest pain, chest pressure/discomfort, lower extremity edema, palpitations  Gastrointestinal:  negative for abdominal pain, constipation, diarrhea, nausea, vomiting  Neurological:  negative for dizziness, headache    BRIEF HISTORY     The patient is a 19 y.o. female with no significant past medical history who was initially admitted on 2/15/2022 with Cardiac arrest [facility]) [I46.9] after swallowing crack cocaine and was brought from a correctional facility. Patient does not remember any of the events at the correctional facility and she only remembers waking up in the hospital. Per EMR, patient became altered and collapsed at the half-way and was also observed to have tonic-clonic movements on a security camera footage.  After the seizure, patient had PEA arrest that required CPR and several rounds of epinephrine for about 20 minutes after which ROSC was achieved.      In the ER, patient presented intubated, sedated and hypotensive on presentation. She was being initiated on therapeutic hypothermia however she followed verbal commands and hypothermia was discontinued. She was requiring vasopressor support and was sedated on fentanyl and Versed infusions. Cardiology was consulted. Patient also had PRATIMA and leukocytosis. She was also cocaine positive.      In the ICU, patient remained intubated and sedated on low vent setting. Cardiology recommended elevated troponin likely type II NSTEMI and was started on heparin drip. Echo showed LVEF 60% and no significant valvular regurgitation or stenosis. Patient was extubated on 22 to nasal canula 2L. She tolerated extubation well. Psychiatry was consulted and recommended patient with moderate risk of suicide and recommended admission to psychiatry once medically clear.     Currently, the patient is doing well, on room air saturating well. Vitals stable. She reported mild sore throat post extubation. She has been eating well post extubation, no concerns with urination or bowel movements. She denies shortness of breath, chest pain, palpitation, dizziness or dysuria.        OBJECTIVE     Vital Signs:  BP (!) 115/56   Pulse 97   Temp 98.2 °F (36.8 °C) (Oral)   Resp 20   Ht 5' 11\" (1.803 m)   Wt 180 lb (81.6 kg)   SpO2 99%   BMI 25.10 kg/m²     Temp (24hrs), Av.1 °F (36.7 °C), Min:97.9 °F (36.6 °C), Max:98.2 °F (36.8 °C)    In: 1640   Out: -     Physical Exam:  Constitutional: This is a well developed, well nourished, 25-29.9 - Overweight 19y.o. year old female who is alert, oriented, cooperative and in no apparent distress. Head:normocephalic and atraumatic. EENT:  PERRLA. No conjunctival injections. Septum was midline, mucosa was without erythema, exudates or cobblestoning.   No thrush was noted. Neck: Supple without thyromegaly. No elevated JVP. Trachea was midline. Respiratory: Chest was symmetrical without dullness to percussion. Breath sounds bilaterally were clear to auscultation. There were no wheezes, rhonchi or rales. There is no intercostal retraction or use of accessory muscles. No egophony noted. Cardiovascular: Regular without murmur, clicks, gallops or rubs. Abdomen: Slightly rounded and soft without organomegaly. No rebound, rigidity or guarding was appreciated. Lymphatic: No lymphadenopathy. Musculoskeletal: Normal curvature of the spine. No gross muscle weakness. Extremities:  No lower extremity edema, ulcerations, tenderness, varicosities or erythema. Muscle size, tone and strength are normal.  No involuntary movements are noted. Skin:  Warm and dry. Good color, turgor and pigmentation. No lesions or scars.   No cyanosis or clubbing  Neurological/Psychiatric: The patient's general behavior, level of consciousness, thought content and emotional status is normal.        Medications:  Scheduled Medications:    enoxaparin  40 mg SubCUTAneous Daily    sodium chloride flush  5-40 mL IntraVENous 2 times per day    chlorhexidine  15 mL Mouth/Throat BID    aspirin  81 mg Oral Daily     Continuous Infusions:    sodium chloride      dextrose       PRN Medicationssodium chloride flush, 5-40 mL, PRN  sodium chloride, 25 mL, PRN  polyethylene glycol, 17 g, Daily PRN  acetaminophen, 650 mg, Q6H PRN   Or  acetaminophen, 650 mg, Q6H PRN  ondansetron, 4 mg, Q8H PRN   Or  ondansetron, 4 mg, Q6H PRN  glucose, 15 g, PRN  dextrose, 12.5 g, PRN  glucagon (rDNA), 1 mg, PRN  dextrose, 100 mL/hr, PRN        Diagnostic Labs:  CBC:   Recent Labs     02/17/22  0552 02/18/22  0524   WBC 12.4* 12.7*   RBC 3.73* 3.81*   HGB 11.3* 11.6*   HCT 34.4* 37.0   MCV 92.2 97.1   RDW 13.4 13.2    223     BMP:   Recent Labs     02/16/22  0846 02/17/22  0552 02/18/22  0524   * 137 136 K 4.2 3.9 3.5*   * 109* 108*   CO2 18* 17* 16*   PHOS 2.8  --   --    BUN 12 9 6   CREATININE 0.97* 0.67 0.57     BNP: No results for input(s): BNP in the last 72 hours. PT/INR: No results for input(s): PROTIME, INR in the last 72 hours. APTT:   Recent Labs     02/16/22  1740 02/17/22  0048 02/17/22  0623   APTT 42.6* 47.8* 53.9*     CARDIAC ENZYMES: No results for input(s): CKMB, CKMBINDEX, TROPONINI in the last 72 hours. Invalid input(s): CKTOTAL;3  FASTING LIPID PANEL:  Lab Results   Component Value Date    CHOL 121 10/29/2013    HDL 38 (L) 10/29/2013    TRIG 28 10/29/2013     LIVER PROFILE:   Recent Labs     02/16/22  0846   *   *   BILIDIR <0.08   BILITOT 0.23*   ALKPHOS 58      MICROBIOLOGY: No results found for: CULTURE    Imaging:    CT Head WO Contrast    Result Date: 2/15/2022  No acute intracranial abnormality. CT ABDOMEN PELVIS W IV CONTRAST Additional Contrast? None    Result Date: 2/15/2022  Diffuse dilation of the small and large bowel likely representing a moderate ileus. Moderate-sized fecalith is identified within the rectal vault. Bibasilar infiltrates greater on the left likely representing dependent atelectasis. Pulmonary contusion is considered less likely noting history of extended chest compressions for cardiac arrest. RECOMMENDATIONS: Unavailable     XR CHEST PORTABLE    Result Date: 2/16/2022  1. New central venous catheter overlying IVC and right atrium. Endotracheal and enteric tubes remain in place. 2.  No acute intrathoracic process. XR CHEST PORTABLE    Result Date: 2/15/2022  No acute process. Endotracheal tube located 4.4 cm above the diana     CT CHEST PULMONARY EMBOLISM W CONTRAST    Result Date: 2/15/2022  No evidence of pulmonary embolism. Mild airspace disease within the right upper lobe posteriorly as well as the posterior dependent portions of both lung bases.   These changes may represent atelectasis versus mild contusion secondary to prolonged chest compressions. RECOMMENDATIONS: Unavailable       ASSESSMENT & PLAN     ASSESSMENT / PLAN:     Principal Problem:    [facility]  Active Problems:    Depression    PRATIMA (acute kidney injury) ([facility]    Elevated troponin    QT prolongation    Non-ST elevation MI (NSTEMI) ([facility]  Resolved Problems:    * No resolved hospital problems. *    S/p Cardiac arrest  - PEA arrest after ingesting crack cocaine at correction facility; ROSC achieved afetr 20 mins of CPR and multiple rounds of epinephrine  - Cardiology was following: Was on heparin gtt for NSTEMI Type-2  - Was intubated and mechanically ventilated in the field after cardiac arrest, extubated on 2/17  - No chest pain or shortness of breath currently; stable from cardiology standpoint  - Echo 2/15: LVEF 60%; no significant valvular regurgitation or stenosis seen     Depression  - Psychiatry consulted: Risk of suicide, recommended admission to psychiatry once medically clear.  -Patient is medically cleared for discharge to [facility]     PRATIMA - Resolved  Cr 0.57     Diet: Regular  DVT PPX: Lovenox  GI PPX: not indicated     PT/OT: On board  Discharge Planning: Patient will be admitted to [facility] once medically cleared. Patient is medically cleared. Yuliana Jones MD  Internal Medicine Resident, PGY-1  [facility]; [place], [place]  2/19/2022, 7:31 AM    Attending Physician Statement  I have discussed the care of Ally Nuñez with the resident team. I have examined the patient myself and taken ros and hpi , including pertinent history and exam findings,  with the resident. I have reviewed the key elements of all parts of the encounter with the resident. I agree with the assessment, plan and orders as documented by the resident.     Principal Problem:    Cardiac arrest [facility])  Active Problems:    Depression    PRATIMA (acute kidney injury) ([facility]    Elevated troponin    QT prolongation    Non-ST elevation MI (NSTEMI) ([facility]  Resolved Problems: * No resolved hospital problems.  *    Patient improved, medically cleared to be discharged to [facility] today    Electronically signed by Dyan Negrete MD

## 2022-02-20 ENCOUNTER — HOSPITAL ENCOUNTER (INPATIENT)
Age: 22
LOS: 3 days | Discharge: HOME | DRG: 751 | End: 2022-02-23
Attending: PSYCHIATRY & NEUROLOGY | Admitting: PSYCHIATRY & NEUROLOGY
Payer: MEDICAID

## 2022-02-20 PROBLEM — F32.2 MAJOR DEPRESSIVE DISORDER, SINGLE EPISODE, SEVERE WITHOUT PSYCHOSIS (HCC): Status: ACTIVE | Noted: 2022-02-20

## 2022-02-20 PROCEDURE — 1240000000 HC EMOTIONAL WELLNESS R&B

## 2022-02-20 PROCEDURE — 6370000000 HC RX 637 (ALT 250 FOR IP): Performed by: PSYCHIATRY & NEUROLOGY

## 2022-02-20 PROCEDURE — APPSS180 APP SPLIT SHARED TIME > 60 MINUTES

## 2022-02-20 PROCEDURE — 99223 1ST HOSP IP/OBS HIGH 75: CPT | Performed by: PSYCHIATRY & NEUROLOGY

## 2022-02-20 PROCEDURE — 99239 HOSP IP/OBS DSCHRG MGMT >30: CPT | Performed by: INTERNAL MEDICINE

## 2022-02-20 RX ORDER — ACETAMINOPHEN 325 MG/1
650 TABLET ORAL EVERY 4 HOURS PRN
Status: DISCONTINUED | OUTPATIENT
Start: 2022-02-20 | End: 2022-02-23 | Stop reason: HOSPADM

## 2022-02-20 RX ORDER — HYDROXYZINE 50 MG/1
50 TABLET, FILM COATED ORAL 3 TIMES DAILY PRN
Status: DISCONTINUED | OUTPATIENT
Start: 2022-02-20 | End: 2022-02-23 | Stop reason: HOSPADM

## 2022-02-20 RX ORDER — POLYETHYLENE GLYCOL 3350 17 G/17G
17 POWDER, FOR SOLUTION ORAL DAILY PRN
Status: DISCONTINUED | OUTPATIENT
Start: 2022-02-20 | End: 2022-02-23 | Stop reason: HOSPADM

## 2022-02-20 RX ORDER — TRAZODONE HYDROCHLORIDE 50 MG/1
50 TABLET ORAL NIGHTLY PRN
Status: DISCONTINUED | OUTPATIENT
Start: 2022-02-20 | End: 2022-02-23 | Stop reason: HOSPADM

## 2022-02-20 RX ORDER — MAGNESIUM HYDROXIDE/ALUMINUM HYDROXICE/SIMETHICONE 120; 1200; 1200 MG/30ML; MG/30ML; MG/30ML
30 SUSPENSION ORAL EVERY 6 HOURS PRN
Status: DISCONTINUED | OUTPATIENT
Start: 2022-02-20 | End: 2022-02-23 | Stop reason: HOSPADM

## 2022-02-20 RX ORDER — IBUPROFEN 400 MG/1
400 TABLET ORAL EVERY 6 HOURS PRN
Status: DISCONTINUED | OUTPATIENT
Start: 2022-02-20 | End: 2022-02-23 | Stop reason: HOSPADM

## 2022-02-20 RX ADMIN — ALUMINUM HYDROXIDE, MAGNESIUM HYDROXIDE, AND SIMETHICONE 30 ML: 200; 200; 20 SUSPENSION ORAL at 10:44

## 2022-02-20 RX ADMIN — ALUMINUM HYDROXIDE, MAGNESIUM HYDROXIDE, AND SIMETHICONE 30 ML: 200; 200; 20 SUSPENSION ORAL at 04:16

## 2022-02-20 RX ADMIN — NICOTINE POLACRILEX 2 MG: 2 GUM, CHEWING BUCCAL at 21:39

## 2022-02-20 ASSESSMENT — LIFESTYLE VARIABLES
HISTORY_ALCOHOL_USE: NO
HISTORY_ALCOHOL_USE: NO

## 2022-02-20 ASSESSMENT — SLEEP AND FATIGUE QUESTIONNAIRES
DO YOU HAVE DIFFICULTY SLEEPING: NO
AVERAGE NUMBER OF SLEEP HOURS: 8
DO YOU USE A SLEEP AID: NO

## 2022-02-20 ASSESSMENT — PAIN SCALES - GENERAL: PAINLEVEL_OUTOF10: 0

## 2022-02-20 NOTE — PROGRESS NOTES
Behavioral Services  Medicare Certification Upon Admission    I certify that this patient's inpatient psychiatric hospital admission is medically necessary for:    [x] (1) Treatment which could reasonably be expected to improve this patient's condition,       [x] (2) Or for diagnostic study;     AND     [x](2) The inpatient psychiatric services are provided while the individual is under the care of a physician and are included in the individualized plan of care.     Estimated length of stay/service -3-9 days    Plan for post-hospital care -outpatient care    Electronically signed by Mikael Santiago MD on 2/20/2022 at 4:44 PM

## 2022-02-20 NOTE — H&P
Department of Psychiatry  Attending Physician Psychiatric Assessment     Reason for Admission to Psychiatric Unit:  Threat to self requiring 24 hour professional observation  Concerns about patient's safety in the community    CHIEF COMPLAINT: Overdose attempt, suicidal ideation    History obtained from: Patient, electronic medical record          HISTORY OF PRESENT ILLNESS:    Alex Sood is a 19 y.o. female who reports no past medical history. Patient presented to the ED after swallowing an unknown amount of cocaine and overdosing when she was on her way to long-term. Patient required hospitalization for cardiac arrest and seizure. It was reported that patient stated overdosing was \"better than long-term\". When approached for interview patient is minimizing and denying all symptoms or memory of the event. Patient states she remembers the day before going to long-term and then woke up in the hospital after the overdose. Patient has no recollection of what happened, if she did or did not swallow cocaine or why she was going to long-term. Patient states that she has never blacked out like this before, this is the first time she has ever had a lapse in memory. When discussing going to long-term patient reports that her dad told her \"someone accused me of doing something that I did not do, so I should not have been in long-term to begin with. That is all I know\". Patient refused to speak further about which she was accused of. Patient presented as very standoffish and her responses did not appear genuine. During conversations about topics such as long-term and being falsely accused patient was observed smirking. Patient gave as little detail as possible when being questioned about current and past legal charges.   Patient also reported that she was wrongly accused of driving under the influence and received a DUI that was false in her past.  Patient was minimizing of all mental health symptoms stating that she is not depressed, has never been depressed and has never felt suicidal.  Patient denies current anxiety or ever feeling anxious in the past.  Patient denies symptoms of darlyn and psychosis. Patient states that she has not dealt with any trauma in her life and denies all symptoms of PTSD. Patient does endorse a history of panic attacks and reported last time she had 1 was before going to [facility] a few months ago. Due to severity of overdose patient is not appropriate for a lower level of care at this time and requires further observation during inpatient hospitalization. Patient denies all alcohol consumption reports she has never drank alcohol. Patient denies all illicit drug use and reports she has never tried drugs. UDS was negative upon admission. History of head trauma: [x] Yes [] No    History of seizures: [x] Yes [] No, 1 seizure a few days ago after overdose    History of violence or aggression: [] Yes [x] No         PSYCHIATRIC HISTORY:  [] Yes [x] No    Currently follows with: Denies  Denies lifetime suicide attempts  Denies psychiatric hospital admissions    Home Medication Compliance: [] Yes [x] N/a, denies any previous medication    Past psychiatric medications includes: Denies any previous medication trials    Adverse reactions from psychotropic medications: [] Yes [x] N/a         Lifetime Psychiatric Review of Systems         Depression: Reports she is not depressed, has never been depressed and has never felt suicidal.     Anxiety: Denies     Panic Attacks: Reports a history of     Darlyn or Hypomania: Denies     Phobias: Denies     Obsessions and Compulsions: Denies     Body or Vocal Tics: Denies     Visual Hallucinations: Denies     Auditory Hallucinations: Denies     Delusions/Paranoia: Denies     PTSD: Denies    Past Medical History:    History reviewed. No pertinent past medical history. Past Surgical History:    History reviewed. No pertinent surgical history.     Allergies:  Lactose intolerance (gi)         Social History:     Born in: Reports being born in [address] [place] [address]  Family: Raised by her mother and father, who are now . Reports she is close with both of them. States she has 2 brothers and 2 sisters. Highest Level of Education: Some college. 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 Reports working getting custody back from her mother. Residence: Renting an apartment. Stressors: Legal  Patient Assets/Supportive Factors: Family         DRUG USE HISTORY  Social History     Tobacco Use   Smoking Status Never Smoker   Smokeless Tobacco Never Used     Social History     Substance and Sexual Activity   Alcohol Use None     Social History     Substance and Sexual Activity   Drug Use Not on file       Patient denies all alcohol consumption reports she has never drank alcohol. Patient denies all illicit drug use and reports she has never tried drugs. UDS was negative upon admission. LEGAL HISTORY:   HISTORY OF INCARCERATION: [x] Yes [] No    Family History:   History reviewed. No pertinent family history. Psychiatric Family History  Patient endorses psychiatric family history. Reports her sister has \"something\"    Suicides in family: [] Yes [x] No    Substance use in family: [] Yes [x] No         PHYSICAL EXAM:  Vitals:  /85   Pulse 82   Temp 97 °F (36.1 °C) (Oral)   Resp 14   Ht 5' 6\" (1.676 m)   Wt 180 lb (81.6 kg)   LMP 02/20/2022   BMI 29.05 kg/m²   Pain Level: Denies    LABS:  Labs reviewed: [x] Yes  Last EKG in EMR reviewed: [x] Yes  2/19/2022 QTc 519          Review of Systems   Constitutional: Negative for chills and weight loss. HENT: Negative for ear pain and nosebleeds. Eyes: Negative for blurred vision and photophobia. Respiratory: Negative for cough, shortness of breath and wheezing. Cardiovascular: Negative for chest pain and palpitations.    Gastrointestinal: Negative for abdominal pain, diarrhea and vomiting. Genitourinary: Negative for dysuria and urgency. Musculoskeletal: Negative for falls and joint pain. Skin: Negative for itching and rash. Neurological: Negative for tremors, seizures and weakness. Endo/Heme/Allergies: Does not bruise/bleed easily. Physical Exam:   Constitutional:  Appears well-developed and well-nourished, no acute distress. HENT:   Head: Normocephalic and atraumatic. Eyes: Conjunctivae are normal. Right eye exhibits no discharge. Left eye exhibits no discharge. No scleral icterus. Neck: Normal range of motion. Neck supple. Pulmonary/Chest:  No respiratory distress or accessory muscle use, no wheezing. Cardiac: Regular rate and rhythm. Abdominal: Soft. Non-tender. Exhibits no distension. Musculoskeletal: Normal range of motion. Exhibits no edema. Neurological: cranial nerves II-XII grossly in tact, normal gait and station. Skin: Skin is warm and dry. Patient is not diaphoretic. No erythema. Mental Status Examination:    Level of consciousness: Awake and alert  Appearance:  Appropriate attire, seated in chair, fair grooming   Behavior/Motor: Approachable, no psychomotor abnormalities noted  Attitude toward examiner:  Cooperative, attentive, good eye contact  Speech: Normal rate, volume, and tone. Mood: \"Fine\"  Affect:  Inappropriate at times  Thought processes: Linear, goal directed and coherent and restricted  Thought content: Denies suicidal ideations, without current plan or intent, contracts for safety on the unit.                Denies homicidal ideations               Denies hallucinations              Denies delusions              Denies paranoia  Cognition:  Oriented to self, location, time, situation  Concentration: Clinically adequate  Memory: Impaired  Insight &Judgment: Poor           DSM-5 Diagnosis    Principal Problem: Major depressive disorder, single episode, severe without psychosis ([facility] [address]      Psychosocial and Contextual factors:  Financial   Occupational   Relationship   Legal X  Living situation   Educational     History reviewed. No pertinent past medical history. TREATMENT CONSIDERATIONS    Continue inpatient psychiatric treatment. Home medications reviewed. Medications as determined by attending physician  Monitor need and frequency of PRN medications. Attempt to develop insight. Follow-up daily while inpatient. Reviewed risks and benefits as well as potential side effects with patient. CONSULT:  [x] Yes [] No  Internal medicine for medical management/medical H&P      Risk Management: close watch per standard protocol      Psychotherapy: participation in milieu and group and individual sessions with Attending Physician,  and Physician Assistant/CNP      Estimated length of stay:  2-14 days      GENERAL PATIENT/FAMILY EDUCATION  Patient will understand basic signs and symptoms, patient will understand benefits/risks and potential side effects from proposed medications, and patient will understand their role in recovery. Family is not active in patient's care. Patient assets that may be helpful during treatment include: Intent to participate and engage in treatment, sufficient fund of knowledge and intellect to understand and utilize treatments. Goals:    1) Remission of suicidal ideation. 2) Stabilization of symptoms prior to discharge. 3) Establish efficacy and tolerability of medications. Behavioral Services  Medicare Certification     Admission Day 1  I certify that this patient's inpatient psychiatric hospital admission is medically necessary for:    x (1) treatment which could reasonably be expected to improve this patient's condition, or    x (2) diagnostic study or its equivalent.      Time Spent: 60 minutes    Michelle Dominguez is a 19 y.o. female being evaluated face to face    --GHADA Byrne CNP on 2/20/2022 at 2:49 PM    An electronic signature was used to authenticate this note. I independently saw and evaluated the patient. I reviewed the  documentation above. Any additional comments or changes to the midlevel provider's documentation are stated below otherwise agree with assessment. The patient was seen face-to-face. She has been nauseated and has vomited. She states the vomiting has been going on for some time. The patient continues to claim no recollection of the circumstances that led to her admission. She does not recall having cocaine. She denies a history of cocaine use. She denies taking an overdose of cocaine or being in a correctional facility. She states she is anxious but her mood is otherwise okay. She denies any depressive symptoms or suicidal ideation. The patient denies any auditory or visual hallucinations or psychotic phenomena. PLAN  Held psychotropic medications because of ongoing vomiting and prolonged QTc  Attempt to develop insight  Psycho-education conducted. Supportive Therapy conducted. Probable discharge is 2-3 days.    Follow-up daily while on inpatient unit    Electronically signed by Yong Kohli MD on 2/20/22 at 4:44 PM EST

## 2022-02-20 NOTE — BH NOTE
[facility]  Admission Note     Admission Type:   Admission Type: Voluntary    Reason for admission:  Reason for Admission: Patient reportedly OD on cocaine while being booked at a correctional facility   Patient states she doesn't remember anything about what led to her coming here and states she just woke up in the hospital. Patient denied drug use currently but did admit to a history of cocaine use. Patient denies suicidal or homicidal thoughts. Patient admitted to some anxiety. Patient denied any hallucinations. Patient stated that she has no history of suicide attempts. Patient did stated \"Its better than alf\". Patient was wanded for safety and changed into hospital attire. Patient signed all paperwork and was cooperative with admission process. PATIENT STRENGTHS:  Strengths: No significant Physical Illness    Patient Strengths and Limitations:  Limitations: Difficulty problem solving/relies on others to help solve problems    Addictive Behavior:   Addictive Behavior  In the past 3 months, have you felt or has someone told you that you have a problem with:  : None  Do you have a history of Chemical Use?: No  Do you have a history of Alcohol Use?: No  Do you have a history of Street Drug Abuse?: No  Histroy of Prescripton Drug Abuse?: No    Medical Problems:   History reviewed. No pertinent past medical history.     Status EXAM:  Status and Exam  Normal: Yes  Affect: Appropriate  Level of Consciousness: Alert  Mood:Normal: No  Mood: Anxious  Motor Activity:Normal: Yes  Interview Behavior: Cooperative  Preception: Oliver to Person,Oliver to Time,Oliver to Place,Oliver to Situation  Attention:Normal: Yes  Thought Content:Normal: Yes  Hallucinations: None  Delusions: No  Memory:Normal: Yes  Insight and Judgment: No  Insight and Judgment: Poor Insight,Poor Judgment  Present Suicidal Ideation: No  Present Homicidal Ideation: No    Tobacco Screening:  Practical Counseling, on admission, loly EVANS, timothy applicable and completed (first 3 are required if patient doesn't refuse):            ( )  Recognizing danger situations (included triggers and roadblocks)                    ( )  Coping skills (new ways to manage stress, exercise, relaxation techniques, changing routine, distraction)                                                           ( )  Basic information about quitting (benefits of quitting, techniques in how to quit, available resources  ( ) Referral for counseling faxed to [facility]                                           ( ) Patient refused counseling  (x ) Patient has not smoked in the last 30 days    Metabolic Screening:    No results found for: LABA1C    Lab Results   Component Value Date    CHOL 121 10/29/2013     Lab Results   Component Value Date    TRIG 28 10/29/2013     Lab Results   Component Value Date    HDL 38 (L) 10/29/2013     No components found for: LDLCAL  No results found for: LABVLDL      Body mass index is 29.05 kg/m². BP Readings from Last 2 Encounters:   02/20/22 104/87   02/19/22 127/87           Pt admitted with followings belongings:  Dental Appliances: None  Vision - Corrective Lenses: None  Hearing Aid: None  Jewelry: None  Body Piercings Removed: N/A  Clothing: Socks,Sweater,Pants  Were All Patient Medications Collected?: Not Applicable  Other Valuables: None     Patient oriented to surroundings and program expectations and copy of patient rights given. Received admission packet: yes. Consents reviewed, signed yes. Patient verbalized understanding:  yes.   Patient education on precautions: yes                  Lisha Carvajal RN

## 2022-02-20 NOTE — PLAN OF CARE
Problem: Depressive Behavior With or Without Suicide Precautions:  Goal: Ability to disclose and discuss suicidal ideas will improve  Description: Ability to disclose and discuss suicidal ideas will improve  Note: Ms. Ani Rodriges denies suicidal ideation and thoughts of harm to self and others. She denies feeling depressed and inquires of her discharge. She asked if she will be discharged home or to residential. Writer called [facility] and asked about a matta status and was told they had no notification of matta status at this time. Safety rounds continued.

## 2022-02-20 NOTE — GROUP NOTE
Group Therapy Note    Date: 2/20/2022    Group Start Time: 8782  Group End Time: 5504  Group Topic: Psychoeducation    URI GODINEZI DIMA Jiménez        Group Therapy Note         Patient refused to attend psychotherapy group after encouragement from staff. 1:1 talk time offered but refused. Signature:   Allen Jiménez

## 2022-02-20 NOTE — PLAN OF CARE
[facility]  Initial Interdisciplinary Treatment Plan NO      Original treatment plan Date & Time: 2/20/2022   1057    Admission Type:  Admission Type: Voluntary    Reason for admission:   Reason for Admission: Patient reportedly OD on cocaine while being booked at a correctional facility    Estimated Length of Stay:  5-7days  Estimated Discharge Date: to be determined by physician    PATIENT STRENGTHS:  Patient Strengths:Strengths: No significant Physical Illness  Patient Strengths and Limitations:Limitations: Difficulty problem solving/relies on others to help solve problems  Addictive Behavior: Addictive Behavior  In the past 3 months, have you felt or has someone told you that you have a problem with:  : None  Do you have a history of Chemical Use?: No  Do you have a history of Alcohol Use?: No  Do you have a history of Street Drug Abuse?: No  Histroy of Prescripton Drug Abuse?: No  Medical Problems:History reviewed. No pertinent past medical history.   Status EXAM:Status and Exam  Normal: Yes  Affect: Appropriate  Level of Consciousness: Alert  Mood:Normal: No  Mood: Anxious  Motor Activity:Normal: Yes  Interview Behavior: Cooperative  Preception: Alverda to Person,Alverda to Time,Alverda to Place,Alverda to Situation  Attention:Normal: Yes  Thought Content:Normal: Yes  Hallucinations: None  Delusions: No  Memory:Normal: Yes  Insight and Judgment: No  Insight and Judgment: Poor Insight,Poor Judgment  Present Suicidal Ideation: No  Present Homicidal Ideation: No    EDUCATION:   Learner Progress Toward Treatment Goals: reviewed group plans and strategies for care    Method:group therapy, medication compliance, individualized assessments and care planning    Outcome: needs reinforcement    PATIENT GOALS: to be discussed with patient within 72 hours    PLAN/TREATMENT RECOMMENDATIONS:     continue group therapy , medications compliance, goal setting, individualized assessments and care, continue to monitor pt on unit      SHORT-TERM GOALS:   Time frame for Short-Term Goals: 5-7 days    LONG-TERM GOALS:  Time frame for Long-Term Goals: 6 months  Members Present in Team Meeting: See Signature Sheet    Aroldo Huizar

## 2022-02-20 NOTE — CARE COORDINATION
BHI Biopsychosocial Assessment    Current Level of Psychosocial Functioning     Independent   Dependent    Minimal Assist X     Psychosocial High Risk Factors (check all that apply)    Unable to obtain meds   Chronic illness/pain    Substance abuse X Crack/Cocaine Marijuana   Lack of Family Support   Financial stress X  Isolation X  Inadequate Community Resources   Suicide attempt(s) X  Not taking medications X  Victim of crime   Developmental Delay  Unable to manage personal needs  X  Age 72 or older   Homeless  No transportation   Readmission within 30 days  Unemployment  Traumatic Event    Psychiatric Advanced Directives: none reported     Family to Involve in Treatment: Pt reports that her mother and father are supportive and involved in her care     Sexual Orientation:  CECILE    Patient Strengths: adequate housing, family support, linked for counseling at [facility]     Patient Barriers: presenting on admission after Pt went into cardiac arrest after injesting a bag of drugs, PT was in a correctional facility, has out of state insurance     Opiate Education Provided: N/A Pt denies Heroin or Opiate use/abuse. Pt reports Marijuana use, t went into cardiac arrest after injesting a bag of drugs,    CMHC/mental health history: Pt reports that she currently follows up with a counselor at [facility]. She denies being prescribed any Psychiatric medications. Plan of Care   medication management, group/individual therapies, family meetings, psycho -education, treatment team meetings to assist with stabilization, referral to community resources. Initial Discharge Plan:  Pt reports a plan to return to her home in [facility] at discharge and to continue following up at [facility] for Counseling following discharge. Clinical Summary:  Warren Leung is a 19 y.o. female who originally presented to senior living. It was documented in pt chart that  patient became altered, and collapsed.   It was also documented that PT went into Cardiac Arrest and  received 20 minutes CPR. Reportedly patient swallowed crack cocaine prior to incident. Arrives to [facility] intubated and was in the intensive care unit.      During SW assessment PT is very guarded and is reporting that she has no recollection of the circumstances of her admission. The patient states she remembers waking up in the hospital and does not  remember what was going on before that. She does not recall being in a correctional facility, in an ambulance or taking cocaine. Pt denies any past mental Health hospitalizations and denies being prescribed Psychiatric medication. Pt reports that her mood is good. She reports that she experiences occasional sadness but denies persistent depressive symptoms. Pt reports that she currently follows up with a counselor at [facility].   -

## 2022-02-21 LAB
EKG ATRIAL RATE: 138 BPM
EKG ATRIAL RATE: 83 BPM
EKG P AXIS: -5 DEGREES
EKG P AXIS: 49 DEGREES
EKG P-R INTERVAL: 126 MS
EKG P-R INTERVAL: 156 MS
EKG Q-T INTERVAL: 294 MS
EKG Q-T INTERVAL: 442 MS
EKG QRS DURATION: 86 MS
EKG QRS DURATION: 90 MS
EKG QTC CALCULATION (BAZETT): 445 MS
EKG QTC CALCULATION (BAZETT): 519 MS
EKG R AXIS: 51 DEGREES
EKG R AXIS: 91 DEGREES
EKG T AXIS: -47 DEGREES
EKG T AXIS: 57 DEGREES
EKG VENTRICULAR RATE: 138 BPM
EKG VENTRICULAR RATE: 83 BPM

## 2022-02-21 PROCEDURE — 99232 SBSQ HOSP IP/OBS MODERATE 35: CPT | Performed by: PSYCHIATRY & NEUROLOGY

## 2022-02-21 PROCEDURE — 99223 1ST HOSP IP/OBS HIGH 75: CPT | Performed by: INTERNAL MEDICINE

## 2022-02-21 PROCEDURE — 1240000000 HC EMOTIONAL WELLNESS R&B

## 2022-02-21 PROCEDURE — 93010 ELECTROCARDIOGRAM REPORT: CPT | Performed by: INTERNAL MEDICINE

## 2022-02-21 PROCEDURE — 6370000000 HC RX 637 (ALT 250 FOR IP): Performed by: PSYCHIATRY & NEUROLOGY

## 2022-02-21 RX ADMIN — ALUMINUM HYDROXIDE, MAGNESIUM HYDROXIDE, AND SIMETHICONE 30 ML: 200; 200; 20 SUSPENSION ORAL at 05:39

## 2022-02-21 NOTE — PLAN OF CARE
[facility]  Day 3 Interdisciplinary Treatment Plan NOTE    Review Date & Time: 2/21/2022  1310    Admission Type:   Admission Type: Voluntary    Reason for admission:  Reason for Admission: Patient reportedly OD on cocaine while being booked at a correctional facility  Estimated Length of Stay: 5-7 days  Estimated Discharge Date Update: to be determined by physician    PATIENT STRENGTHS:  Patient Strengths Strengths: No significant Physical Illness  Patient Strengths and Limitations:Limitations: Difficulty problem solving/relies on others to help solve problems,Tendency to isolate self,Inappropriate/potentially harmful leisure interests,Unrealistic self-view,Apathetic / unmotivated,Difficult relationships / poor social skills,Demonstrates discomfort with /lack of social skills  Addictive Behavior:Addictive Behavior  In the past 3 months, have you felt or has someone told you that you have a problem with:  : None  Do you have a history of Chemical Use?: No  Do you have a history of Alcohol Use?: No  Do you have a history of Street Drug Abuse?: Yes  Histroy of Prescripton Drug Abuse?: No  Medical Problems:History reviewed. No pertinent past medical history.     Risk:  Fall RiskTotal: 63  James Scale James Scale Score: 22  BVC    Change in scores no Changes to plan of Care no    Status EXAM:   Status and Exam  Normal: No  Facial Expression: Flat  Affect: Appropriate  Level of Consciousness: Alert  Mood:Normal: No  Mood: Depressed,Anxious  Motor Activity:Normal: Yes  Interview Behavior: Cooperative  Preception: [place] to Person,[place] to Time,[place] to Place,[place] to Situation  Attention:Normal: Yes  Thought Content:Normal: Yes  Hallucinations: None  Delusions: No  Memory:Normal: Yes  Insight and Judgment: No  Insight and Judgment: Poor Judgment,Poor Insight  Present Suicidal Ideation: No  Present Homicidal Ideation: No    Daily Assessment Last Entry:   Daily Sleep (WDL): Within Defined Limits Patient Currently in Pain: No  Daily Nutrition (WDL): Within Defined Limits    Patient Monitoring:  Frequency of Checks: 4 times per hour, close    Psychiatric Symptoms:   Depression Symptoms  Depression Symptoms: Isolative,Loss of interest  Anxiety Symptoms  Anxiety Symptoms: Generalized  Darlyn Symptoms  Darlyn Symptoms: No problems reported or observed. Psychosis Symptoms  Delusion Type: No problems reported or observed. Suicide Risk CSSR-S:  1) Within the past month, have you wished you were dead or wished you could go to sleep and not wake up? : No  2) Have you actually had any thoughts of killing yourself? : No  3) Have you been thinking about how you might kill yourself? : No  5) Have you started to work out or worked out the details of how to kill yourself?  Do you intend to carry out this plan? : No  6) Have you ever done anything, started to do anything, or prepared to do anything to end your life?: No  Change in Result no Change in Plan of care no      EDUCATION:   EDUCATION:   Learner Progress Toward Treatment Goals: Reviewed results and recommendations of this team, Reviewed group plan and strategies, Reviewed signs, symptoms and risk of self harm and violent behavior, Reviewed goals and plan of care    Method:small group, individual verbal education    Outcome:verbalized by patient, but needs reinforcement to obtain goals    PATIENT GOALS:  Short term: clearing mind  Long term: staying healthy , follow up with apts at [facility]     PLAN/TREATMENT RECOMMENDATIONS UPDATE: continue with group therapies, increased socialization, continue planning for after discharge goals, continue with medication compliance    SHORT-TERM GOALS UPDATE:   Time frame for Short-Term Goals: 5-7 days    LONG-TERM GOALS UPDATE:   Time frame for Long-Term Goals: 6 months  Members Present in Team Meeting: See Signature Sheet    LORRAINE Gutiérrez

## 2022-02-21 NOTE — PLAN OF CARE
Problem: Depressive Behavior With or Without Suicide Precautions:  Goal: Ability to disclose and discuss suicidal ideas will improve  Description: Ability to disclose and discuss suicidal ideas will improve  2/21/2022 0303 by Jayjay Blue RN  Outcome: Ongoing  Patient is cooperative, controlled, visible in milieu, reports that mood is improved and denies depression or suicidal thoughts.

## 2022-02-21 NOTE — GROUP NOTE
Group Therapy Note    Date: 2/21/2022    Group Start Time: 1000  Group End Time: 1100  Group Topic: Psychoeducation    [facility]    TASHA Ayoub LSW        Group Therapy Note      patient refused to attend Psychoeducational group at 10:00 after encouragement from staff.            Signature:  TASHA Ayoub LSW

## 2022-02-21 NOTE — PROGRESS NOTES
BEHAVIORAL HEALTH FOLLOW-UP NOTE     2/21/2022     Patient was seen and examined in person, Chart reviewed   Patient's case discussed with staff/team    Chief Complaint: Overdose attempt, suicidal ideation    Interim History:     Patient seen for follow up assessment. Patient is not on any scheduled medications, has not required any emergency medication in the last 24 hours. Denies any medication side effects. Admits to feeling anxious. Patient states she feels anxious because she would like to be discharged today. She says she lives an hour away in [facility] and needs to get back for court and to pay her rent. She states she will need to find transportation. Denies depression, hallucinations, delusions, and suicidal and homicidal ideation. Patient states her sleep is normal and denies changes in appetite. Discussed request for discharge with social work. Social work plans to talk to patient and Dr. Beatriz Craft regarding discharge. BP (!) 128/92   Pulse 68   Temp 98.4 °F (36.9 °C)   Resp 16   Ht 5' 6\" (1.676 m)   Wt 180 lb (81.6 kg)   LMP 02/20/2022   BMI 29.05 kg/m²   Appetite:  [x] Normal/Unchanged  [] Increased  [] Decreased      Sleep:       [x] Normal/Unchanged  [] Fair       [] Poor              Energy:    [x] Normal/Unchanged  [] Increased  [] Decreased        Aggression:  [] yes  [x] no     Patient is [x] able  [] unable to CONTRACT FOR SAFETY ON THE UNIT    PAST MEDICAL/PSYCHIATRIC HISTORY:   History reviewed. No pertinent past medical history. FAMILY/SOCIAL HISTORY:  History reviewed. No pertinent family history.   Social History     Socioeconomic History    Marital status: Single     Spouse name: Not on file    Number of children: Not on file    Years of education: Not on file    Highest education level: Not on file   Occupational History    Not on file   Tobacco Use    Smoking status: Never Smoker    Smokeless tobacco: Never Used   Substance and Sexual Activity    Alcohol use: Not on file    Drug use: Not on file    Sexual activity: Not on file   Other Topics Concern    Not on file   Social History Narrative    Not on file     Social Determinants of Health     Financial Resource Strain:     Difficulty of Paying Living Expenses: Not on file   Food Insecurity:     Worried About Running Out of Food in the Last Year: Not on file    Luci of Food in the Last Year: Not on file   Transportation Needs:     Lack of Transportation (Medical): Not on file    Lack of Transportation (Non-Medical): Not on file   Physical Activity:     Days of Exercise per Week: Not on file    Minutes of Exercise per Session: Not on file   Stress:     Feeling of Stress : Not on file   Social Connections:     Frequency of Communication with Friends and Family: Not on file    Frequency of Social Gatherings with Friends and Family: Not on file    Attends Yarsanism Services: Not on file    Active Member of [address] [place] [address] [employer] or Organizations: Not on file    Attends Club or Organization Meetings: Not on file    Marital Status: Not on file   Intimate Partner Violence:     Fear of Current or Ex-Partner: Not on file    Emotionally Abused: Not on file    Physically Abused: Not on file    Sexually Abused: Not on file   Housing Stability:     Unable to Pay for Housing in the Last Year: Not on file    Number of [place] in the Last Year: Not on file    Unstable Housing in the Last Year: Not on file           ROS:  [x] All negative/unchanged except if checked.  Explain positive(checked items) below:  [] Constitutional  [] Eyes  [] Ear/Nose/Mouth/Throat  [] Respiratory  [] CV  [] GI  []   [] Musculoskeletal  [] Skin/Breast  [] Neurological  [] Endocrine  [] Heme/Lymph  [] Allergic/Immunologic    Explanation:     MEDICATIONS:    Current Facility-Administered Medications:     acetaminophen (TYLENOL) tablet 650 mg, 650 mg, Oral, Q4H PRN, Kei De La Cruz MD    aluminum & magnesium hydroxide-simethicone (MAALOX) 974-198-80 MG/5ML suspension 30 mL, 30 mL, Oral, Q6H PRN, Tito Rojas MD, 30 mL at 02/21/22 0539    hydrOXYzine (ATARAX) tablet 50 mg, 50 mg, Oral, TID PRN, Tito Rojas MD    ibuprofen (ADVIL;MOTRIN) tablet 400 mg, 400 mg, Oral, Q6H PRN, Tito Rojas MD    traZODone (DESYREL) tablet 50 mg, 50 mg, Oral, Nightly PRN, Tito Rojas MD    polyethylene glycol (GLYCOLAX) packet 17 g, 17 g, Oral, Daily PRN, Tito Rojas MD    nicotine polacrilex (NICORETTE) gum 2 mg, 2 mg, Oral, PRN, Tito Rojas MD, 2 mg at 02/20/22 8212      Examination:  BP (!) 128/92   Pulse 68   Temp 98.4 °F (36.9 °C)   Resp 16   Ht 5' 6\" (1.676 m)   Wt 180 lb (81.6 kg)   LMP 02/20/2022   BMI 29.05 kg/m²   Gait - steady  Medication side effects(SE): denies    Mental Status Examination:    Level of consciousness:  within normal limits   Appearance:  fair grooming and fair hygiene  Behavior/Motor:  no abnormalities noted  Attitude toward examiner:  cooperative and good eye contact  Speech:  normal rate and normal volume   Mood: anxious  Affect:  anxious  Thought processes:  linear and coherent   Thought content:  Homicidal ideation - none  Suicidal Ideation:  denies suicidal ideation  Delusions:  no evidence of delusions  Perceptual Disturbance:  denies any perceptual disturbance  Cognition:  oriented to person, place, and time   Concentration intact  Insight fair   Judgement fair     ASSESSMENT:   Patient symptoms are:  [] Well controlled  [x] Improving  [] Worsening  [] No change      Diagnosis:   Principal Problem:    Major depressive disorder, single episode, severe without psychosis ([facility] [address]  Active Problems:    Depression with suicidal ideation  Resolved Problems:    * No resolved hospital problems. *      LABS:    No results for input(s): WBC, HGB, PLT in the last 72 hours. No results for input(s): NA, K, CL, CO2, BUN, CREATININE, GLUCOSE in the last 72 hours.   No results for input(s): BILITOT, ALKPHOS, AST, ALT in the last 72 hours. Lab Results   Component Value Date    BARBSCNU NEGATIVE 02/15/2022    LABBENZ NEGATIVE 02/15/2022    LABMETH NEGATIVE 02/15/2022    PPXUR NOT REPORTED 02/15/2022     Lab Results   Component Value Date    TSH 1.61 10/29/2013     No results found for: LITHIUM  No results found for: VALPROATE, CBMZ    RISK ASSESSMENT: denies suicidal and homicidal ideation. Appears to be of no risk to self or others. Treatment Plan:  Reviewed current Medications with the patient. Risks, benefits, side effects, drug-to-drug interactions and alternatives to treatment were discussed. The patient consented to treatment. Encourage patient to attend group and other milieu activities. Discharge planning discussed with the patient and treatment team.    PSYCHOTHERAPY/COUNSELING:  [] Therapeutic interview  [x] Supportive  [] CBT  [] Ongoing  [] Other    [x] Patient continues to need, on a daily basis, active treatment furnished directly by or requiring the supervision of inpatient psychiatric personnel      Anticipated Length of stay: determined by Dr. Haynes Todd is a 19 y.o. female being evaluated face to face. --Oly Vargas, Medical Student on 2/21/2022 at 12:33 PM    An electronic signature was used to authenticate this note. I independently saw and evaluated the patient. I reviewed the  documentation above. Any additional comments or changes to the midlevel provider's documentation are stated below otherwise agree with assessment. The patient continues to complain of some anxiety but states it is manageable. She denies depressive symptoms. She denies suicidal ideation. She is tim for safety. She is future oriented and discharged focused. PLAN  Medications as noted above  Attempt to develop insight  Psycho-education conducted. Supportive Therapy conducted.   Probable discharge is 1-2 days  Follow-up daily while on inpatient unit    Electronically signed by Karen Berg MD on 2/21/22 at 8:13 PM EST

## 2022-02-21 NOTE — H&P
[place] [id number] Internal Medicine    CONSULTATION / HISTORY AND PHYSICAL EXAMINATION            Date:   2/21/2022  Patient name:  Юлия aNgy  Date of admission:  2/20/2022 12:21 AM  MRN:   [id number]  Account:  [de-identified]  YOB: 2000  PCP:    No primary care provider on file. Room:   [address] [place] [address]  Code Status:    Full Code    Physician Requesting Consult: Lynn Hernández MD    Reason for Consult:  medical management    Chief Complaint:     No chief complaint on file. Medical management    History Obtained From:     Patient medical record nursing staff    History of Present Illness:     22-year-old -American lady who was in the correctional facility where she was reported to swallowed some unknown drug possible crack cocaine although urine tox screen was negative for cocaine  Patient experienced seizure and had pulseless electrical activity cardiac arrest required several rounds of epinephrine before return of spontaneous circulation she was intubated and sedated [address] critical care and treated with hypothermia protocol    Past Medical History:     History reviewed. No pertinent past medical history. Past Surgical History:     History reviewed. No pertinent surgical history. Medications Prior to Admission:     Prior to Admission medications    Medication Sig Start Date End Date Taking? Authorizing Provider   aspirin 81 MG chewable tablet Take 1 tablet by mouth daily 2/19/22   Demond Moore DO        Allergies:     Lactose intolerance (gi)    Social History:     Tobacco:    reports that she has never smoked. She has never used smokeless tobacco.  Alcohol:      has no history on file for alcohol use. Drug Use:  has no history on file for drug use. Family History:     History reviewed. No pertinent family history. Review of Systems:     Positive and Negative as described in HPI.     CONSTITUTIONAL:  negative for fevers, chills, sweats, fatigue, weight loss  HEENT:  negative for vision, hearing changes, runny nose, throat pain  RESPIRATORY:  negative for shortness of breath, cough, congestion, wheezing. CARDIOVASCULAR:  negative for chest pain, palpitations. GASTROINTESTINAL:  negative for nausea, vomiting, diarrhea, constipation, change in bowel habits, abdominal pain   GENITOURINARY:  negative for difficulty of urination, burning with urination, frequency   INTEGUMENT:  negative for rash, skin lesions, easy bruising   HEMATOLOGIC/LYMPHATIC:  negative for swelling/edema   ALLERGIC/IMMUNOLOGIC:  negative for urticaria , itching  ENDOCRINE:  negative increase in drinking, increase in urination, hot or cold intolerance  MUSCULOSKELETAL:  negative joint pains, muscle aches, swelling of joints  NEUROLOGICAL:  negative for headaches, dizziness, lightheadedness, numbness, pain, tingling extremities       Physical Exam:     BP (!) 128/92   Pulse 68   Temp 98.4 °F (36.9 °C)   Resp 16   Ht 5' 6\" (1.676 m)   Wt 180 lb (81.6 kg)   LMP 2022   BMI 29.05 kg/m²   Temp (24hrs), Av.1 °F (36.7 °C), Min:97.8 °F (36.6 °C), Max:98.4 °F (36.9 °C)    No results for input(s): POCGLU in the last 72 hours. No intake or output data in the 24 hours ending 22 1755  Facial hirsutism noted  General Appearance:  alert, well appearing, and in no acute distress  Mental status: oriented to person, place, and time with normal affect  Head:  normocephalic, atraumatic. Eye: no icterus, redness, pupils equal and reactive, extraocular eye movements intact, conjunctiva clear  Ear: normal external ear, no discharge, hearing intact  Nose:  no drainage noted  Mouth: mucous membranes moist  Neck: supple, no carotid bruits, thyroid not palpable  Lungs: Bilateral equal air entry, clear to ausculation, no wheezing, rales or rhonchi, normal effort  Cardiovascular: normal rate, regular rhythm, no murmur, gallop, rub.   Abdomen: Soft, nontender, nondistended, normal bowel sounds, no hepatomegaly or splenomegaly  Neurologic: There are no new focal motor or sensory deficits, normal muscle tone and bulk, no abnormal sensation, normal speech, cranial nerves II through XII grossly intact  Skin: No gross lesions, rashes, bruising or bleeding on exposed skin area  Extremities:  peripheral pulses palpable, no pedal edema or calf pain with palpation      Investigations:      Laboratory Testing:  No results found for this or any previous visit (from the past 24 hour(s)). Consultations:   IP CONSULT TO INTERNAL MEDICINE  Assessment :      Primary Problem  Major depressive disorder, single episode, severe without psychosis ([facility] [address]    Active Hospital Problems    Diagnosis Date Noted    Major depressive disorder, single episode, severe without psychosis ([facility] [address] [F32.2] 02/20/2022    Depression with suicidal ideation [F32. A, R45.851] 02/19/2022       Plan:     64-year-old -American lady who was in the correctional facility where she was reported to swallowed some unknown drug possible crack cocaine although urine tox screen was negative for cocaine  Patient experienced seizure and had pulseless electrical activity cardiac arrest required several rounds of epinephrine before return of spontaneous circulation she was intubated and sedated [facility] critical care and treated with hypothermia protocol  Labs and vitals reviewed supportive care counseled against any street drug abuse  Sexually active from correctional facility will test for STDs  Facial hirsutism possible polycystic ovarian disease recommend outpatient work-up    Cian Melvin MD  2/21/2022  5:55 PM    Copy sent to Dr. Agustin Potter primary care provider on file. Please note that this chart was generated using voice recognition Dragon dictation software. Although every effort was made to ensure the accuracy of this automated transcription, some errors in transcription may have occurred.

## 2022-02-21 NOTE — GROUP NOTE
Group Therapy Note    Date: 2/21/2022    Group Start Time: 1100  Group End Time: 3129  Group Topic: Psychoeducation    [facility]    Alena CHARAN JangS        Group Therapy Note    Attendees: 8       Patient's Goal:  To improve communication skills     Notes:   Pt was pleasant and participated well     Status After Intervention:  Improved    Participation Level:  Active Listener and Interactive    Participation Quality: appropriate      Speech:  normal       Thought Process/Content: logical       Affective Functioning:flat      Mood: gaurded       Level of consciousness:  Alert      Response to Learning: Progressing to goal      Endings: None Reported    Modes of Intervention: Support, Socialization and Activity      Discipline Responsible: Psychoeducational Specialist      Signature:  Kati Hill

## 2022-02-21 NOTE — GROUP NOTE
HS Goal Group   Date: February 20, 2022     Patient did not participate in HS goal group. 1:1 talk time was offered as an alternative to group. Will continue to encourage patient to participate in unit programming.      Signature: SHAHBAZ Jalloh

## 2022-02-22 LAB — HIV AG/AB: NONREACTIVE

## 2022-02-22 PROCEDURE — 99231 SBSQ HOSP IP/OBS SF/LOW 25: CPT | Performed by: NURSE PRACTITIONER

## 2022-02-22 PROCEDURE — 87389 HIV-1 AG W/HIV-1&-2 AB AG IA: CPT

## 2022-02-22 PROCEDURE — 1240000000 HC EMOTIONAL WELLNESS R&B

## 2022-02-22 PROCEDURE — 36415 COLL VENOUS BLD VENIPUNCTURE: CPT

## 2022-02-22 PROCEDURE — 99232 SBSQ HOSP IP/OBS MODERATE 35: CPT | Performed by: INTERNAL MEDICINE

## 2022-02-22 NOTE — PROGRESS NOTES
[facility] Internal Medicine    CONSULTATION / HISTORY AND PHYSICAL EXAMINATION            Date:   2/22/2022  Patient name:  Rob Kaminski  Date of admission:  2/20/2022 12:21 AM  MRN:   [id number]  Account:  [de-identified]  YOB: 2000  PCP:    No primary care provider on file. Room:   [address] [place] [address]  Code Status:    Full Code    Physician Requesting Consult: Carlette Buerger, MD    Reason for Consult:  medical management    Chief Complaint:     No chief complaint on file. Medical management    History Obtained From:     Patient medical record nursing staff    History of Present Illness:     64-year-old -American lady who was in the correctional facility where she was reported to swallowed some unknown drug possible crack cocaine although urine tox screen was negative for cocaine  Patient experienced seizure and had pulseless electrical activity cardiac arrest required several rounds of epinephrine before return of spontaneous circulation she was intubated and sedated [address] critical care and treated with hypothermia protocol    Past Medical History:     History reviewed. No pertinent past medical history. Past Surgical History:     History reviewed. No pertinent surgical history. Medications Prior to Admission:     Prior to Admission medications    Medication Sig Start Date End Date Taking? Authorizing Provider   aspirin 81 MG chewable tablet Take 1 tablet by mouth daily 2/19/22   Kenzie Dent DO        Allergies:     Lactose intolerance (gi)    Social History:     Tobacco:    reports that she has never smoked. She has never used smokeless tobacco.  Alcohol:      has no history on file for alcohol use. Drug Use:  has no history on file for drug use. Family History:     History reviewed. No pertinent family history. Review of Systems:     Positive and Negative as described in HPI.     CONSTITUTIONAL:  negative for fevers, chills, sweats, fatigue, weight loss  HEENT:  negative for vision, hearing changes, runny nose, throat pain  RESPIRATORY:  negative for shortness of breath, cough, congestion, wheezing. CARDIOVASCULAR:  negative for chest pain, palpitations. GASTROINTESTINAL:  negative for nausea, vomiting, diarrhea, constipation, change in bowel habits, abdominal pain   GENITOURINARY:  negative for difficulty of urination, burning with urination, frequency   INTEGUMENT:  negative for rash, skin lesions, easy bruising   HEMATOLOGIC/LYMPHATIC:  negative for swelling/edema   ALLERGIC/IMMUNOLOGIC:  negative for urticaria , itching  ENDOCRINE:  negative increase in drinking, increase in urination, hot or cold intolerance  MUSCULOSKELETAL:  negative joint pains, muscle aches, swelling of joints  NEUROLOGICAL:  negative for headaches, dizziness, lightheadedness, numbness, pain, tingling extremities       Physical Exam:     /75   Pulse 83   Temp 97.9 °F (36.6 °C) (Tympanic)   Resp 16   Ht 5' 6\" (1.676 m)   Wt 180 lb (81.6 kg)   LMP 2022   BMI 29.05 kg/m²   Temp (24hrs), Av.1 °F (36.7 °C), Min:97.9 °F (36.6 °C), Max:98.2 °F (36.8 °C)    No results for input(s): POCGLU in the last 72 hours. No intake or output data in the 24 hours ending 22  Facial hirsutism noted  General Appearance:  alert, well appearing, and in no acute distress  Mental status: oriented to person, place, and time with normal affect  Head:  normocephalic, atraumatic. Eye: no icterus, redness, pupils equal and reactive, extraocular eye movements intact, conjunctiva clear  Ear: normal external ear, no discharge, hearing intact  Nose:  no drainage noted  Mouth: mucous membranes moist  Neck: supple, no carotid bruits, thyroid not palpable  Lungs: Bilateral equal air entry, clear to ausculation, no wheezing, rales or rhonchi, normal effort  Cardiovascular: normal rate, regular rhythm, no murmur, gallop, rub.   Abdomen: Soft, nontender, nondistended, normal bowel sounds, no hepatomegaly or splenomegaly  Neurologic: There are no new focal motor or sensory deficits, normal muscle tone and bulk, no abnormal sensation, normal speech, cranial nerves II through XII grossly intact  Skin: No gross lesions, rashes, bruising or bleeding on exposed skin area  Extremities:  peripheral pulses palpable, no pedal edema or calf pain with palpation      Investigations:      Laboratory Testing:  No results found for this or any previous visit (from the past 24 hour(s)). Consultations:   IP CONSULT TO INTERNAL MEDICINE  Assessment :      Primary Problem  Major depressive disorder, single episode, severe without psychosis ([facility]    Active Hospital Problems    Diagnosis Date Noted    Major depressive disorder, single episode, severe without psychosis ([facility] [F32.2] 02/20/2022    Depression with suicidal ideation [F32. A, R45.851] 02/19/2022       Plan:     64-year-old -American lady who was in the correctional facility where she was reported to swallowed some unknown drug possible crack cocaine although urine tox screen was negative for cocaine  Patient experienced seizure and had pulseless electrical activity cardiac arrest required several rounds of epinephrine before return of spontaneous circulation she was intubated and sedated [facility] critical care and treated with hypothermia protocol  Labs and vitals reviewed supportive care counseled against any street drug abuse  Sexually active from correctional facility will test for STDs  Facial hirsutism possible polycystic ovarian disease recommend outpatient work-up      Ana Paula Mcdowell MD  2/22/2022  6:03 PM    Copy sent to Dr. Mable Mccormick primary care provider on file. Please note that this chart was generated using voice recognition Dragon dictation software. Although every effort was made to ensure the accuracy of this automated transcription, some errors in transcription may have occurred.

## 2022-02-22 NOTE — GROUP NOTE
Group Therapy Note    Date: 2/22/2022    Group Start Time: 1330  Group End Time: 4407  Group Topic: Cognitive Skills    [facility]    LOGAN Kitchen        Group Therapy Note    Attendees:9         Patient's Goal:  To improve socialilzation skills     Notes:   Pt was pleasant and participated well     Status After Intervention:  Improved    Participation Level:  Active Listener and Interactive    Participation Quality: Appropriate      Speech: normal      Thought Process/Content: Logical      Affective Functioning: Congruent      Mood: euthymic      Level of consciousness:  Alert      Response to Learning: Able to verbalize current knowledge/experience and Progressing to goal      Endings: None Reported    Modes of Intervention: Education, Support and Problem-solving      Discipline Responsible: Psychoeducational Specialist      Signature:  Jose Rafael Jensen

## 2022-02-22 NOTE — GROUP NOTE
HS Goal Group     Date: February 21, 2022   Group Start Time: 2000   Group End Time: 2030   [facility] DUAL DIAGNOSIS   Attendees: 10/16     Group Topic: HS Goal Group   Notes: Patient participated in HS goal group. Status After Intervention: Improved   Participation Level:  Active Listener and Interactive   Participation Quality: Appropriate, attentive and supportive   Speech: Normal   Thought Process/Content: Logical and linear   Affective Functioning: Congruent   Mood: WDL   Level of consciousness: Oriented x4   Response to Learning: Progressing to goal; able to verbalize current knowledge/experience, acknowledge new learning, retain information and change behavior    Endings: None reported   Modes of Intervention: Education and exploration     Discipline Responsible: Behavioral Health Tech   Signature: SHAHBAZ Peterson

## 2022-02-22 NOTE — GROUP NOTE
Group Therapy Note    Date: 2/22/2022    Group Start Time: 1100  Group End Time: 1781  Group Topic: Cognitive Skills    URI Schwartz, CHARANS    Pt did not attend 1100 cognitive group d/t resting in room despite staff invitation to attend. 1:1 talk time offered as alternative to group session, pt declined.               Signature:  Josefina Alvarenga

## 2022-02-22 NOTE — PLAN OF CARE
Problem: Depressive Behavior With or Without Suicide Precautions:  Goal: Ability to disclose and discuss suicidal ideas will improve  Description: Ability to disclose and discuss suicidal ideas will improve  Outcome: Ongoing     Patient denied any depression, anxiety, hallucinations, suicidal or homicidal thoughts. Patient spent the shift aloof in the dayroom. No self harm noted this shift. Safety precautions in place and Q 15 minute checks completed.

## 2022-02-22 NOTE — PLAN OF CARE
Problem: Depressive Behavior With or Without Suicide Precautions:  Goal: Ability to disclose and discuss suicidal ideas will improve  Description: Ability to disclose and discuss suicidal ideas will improve  2/22/2022 1534 by Karthik Zuñiga LPN  Outcome: Ongoing  2/22/2022 0620 by Maeve Patel RN  Outcome: Ongoing   Patient is working on the ability to disclose and discuss suicidal ideas.

## 2022-02-23 VITALS
BODY MASS INDEX: 28.93 KG/M2 | HEIGHT: 66 IN | SYSTOLIC BLOOD PRESSURE: 117 MMHG | HEART RATE: 86 BPM | RESPIRATION RATE: 16 BRPM | WEIGHT: 180 LBS | DIASTOLIC BLOOD PRESSURE: 69 MMHG | TEMPERATURE: 97.6 F

## 2022-02-23 PROCEDURE — 99239 HOSP IP/OBS DSCHRG MGMT >30: CPT | Performed by: PSYCHIATRY & NEUROLOGY

## 2022-02-23 NOTE — GROUP NOTE
Group Therapy Note    Date: 2/23/2022    Group Start Time: 1000  Group End Time: 8867  Group Topic: Psychotherapy    [facility]    Zana Chavez        Group Therapy Note       Patient refused to attend psychotherapy group after encouragement from staff. 1:1 talk time offered but refused. Signature:   Zana Chavez

## 2022-02-23 NOTE — PLAN OF CARE
Problem: Depressive Behavior With or Without Suicide Precautions:  Goal: Ability to disclose and discuss suicidal ideas will improve  Description: Ability to disclose and discuss suicidal ideas will improve  2/23/2022 0130 by Dionicio Urbano RN  Outcome: Ongoing     Patient denied any depression, anxiety, hallucinations, suicidal or homicidal thoughts. Patient spent the shift aloof in the dayroom. No self harm noted this shift. Safety precautions in place and Q 15 minute checks completed.

## 2022-02-23 NOTE — GROUP NOTE
Group Therapy Note    Date: 2/23/2022    Group Start Time: 1100  Group End Time: 4610  Group Topic: Cognitive Skills    URI Mix, CTRS    Pt did not attend 1100 cognitive skills group d/t resting in room despite staff invitation to attend. 1:1 talk time offered as alternative to group session, pt declined.             Signature:  Armida Jolly

## 2022-02-23 NOTE — PLAN OF CARE
Patient denies thoughts of self harm or thoughts to harm others. Patient cooperative on the unit and isolates to room. Patient denies hallucinations of auditory or visual origin. Patient reports readiness for discharge. Will continue to monitor and offer support as needed.

## 2022-02-23 NOTE — BH NOTE
[facility]  Discharge Note    Pt discharged with followings belongings:   Dental Appliances: None  Vision - Corrective Lenses: None  Hearing Aid: None  Jewelry: None  Body Piercings Removed: N/A  Clothing: Socks,Sweater,Pants  Were All Patient Medications Collected?: Not Applicable  Other Valuables: None   Valuables sent home with or returned to patient. Patient education on aftercare instructions. Information faxed to [facility] by RN at 12:21 PM .Patient verbalize understanding of AVS.    Status EXAM upon discharge:  Status and Exam  Normal: No  Facial Expression: Flat,Expressionless  Affect: Appropriate  Level of Consciousness: Alert  Mood:Normal: No  Mood: Depressed,Empty  Motor Activity:Normal: Yes  Interview Behavior: Cooperative  Preception: [place] to Person,[place] to Time,[place] to Place,[place] to Situation  Attention:Normal: Yes  Thought Content:Normal: Yes  Hallucinations: None  Delusions: No  Memory:Normal: Yes  Insight and Judgment: No  Insight and Judgment: Poor Judgment,Poor Insight  Present Suicidal Ideation: No  Present Homicidal Ideation: No      Metabolic Screening:    No results found for: LABA1C    Lab Results   Component Value Date    CHOL 121 10/29/2013     Lab Results   Component Value Date    TRIG 28 10/29/2013     Lab Results   Component Value Date    HDL 38 (L) 10/29/2013     No components found for: LDLCAL  No results found for: Tessa Sharpe RN    Patient discharged home via her own ride from family.

## 2022-02-23 NOTE — DISCHARGE SUMMARY
DISCHARGE SUMMARY      Patient Ana Rosales  [id number]  33 y.o.  2000    Admit date: 2/20/2022    Discharge date and time: 2/23/2022    Disposition: Home     Admitting Physician: Cherie Crump MD     Discharge Physician: Dr Venkata Olivarez MD    Admission Diagnoses: Depression with suicidal ideation [F32. A, R45.851]    Admission Condition: poor    Discharged Condition: stable    Admission Circumstance: Marcial Ly is a 19 y.o. female who reports no past medical history. Patient presented to the ED after swallowing an unknown amount of cocaine and overdosing when she was on her way to [facility]. Patient required hospitalization for cardiac arrest and seizure. It was reported that patient stated overdosing was \"better than [facility]\".    When approached for interview patient is minimizing and denying all symptoms or memory of the event. Patient states she remembers the day before going to [facility] and then woke up in the hospital after the overdose. Patient has no recollection of what happened, if she did or did not swallow cocaine or why she was going to [facility]. Patient states that she has never blacked out like this before, this is the first time she has ever had a lapse in memory. When discussing going to [facility] patient reports that her dad told her \"someone accused me of doing something that I did not do, so I should not have been in [facility] to begin with. That is all I know\". Patient refused to speak further about which she was accused of. Patient presented as very standoffish and her responses did not appear genuine. During conversations about topics such as [facility] and being falsely accused patient was observed smirking. Patient gave as little detail as possible when being questioned about current and past legal charges.   Patient also reported that she was wrongly accused of driving under the influence and received a DUI that was false in her past.  Patient was minimizing of all mental health symptoms stating that she is not depressed, has never been depressed and has never felt suicidal.  Patient denies current anxiety or ever feeling anxious in the past.  Patient denies symptoms of joseph and psychosis. Patient states that she has not dealt with any trauma in her life and denies all symptoms of PTSD. Patient does endorse a history of panic attacks and reported last time she had 1 was before going to family group home a few months ago. Due to severity of overdose patient is not appropriate for a lower level of care at this time and requires further observation during inpatient hospitalization.     Patient denies all alcohol consumption reports she has never drank alcohol. Patient denies all illicit drug use and reports she has never tried drugs. UDS was negative upon admission.         PAST MEDICAL/PSYCHIATRIC HISTORY:   History reviewed. No pertinent past medical history. FAMILY/SOCIAL HISTORY:  History reviewed. No pertinent family history. Social History     Socioeconomic History    Marital status: Single     Spouse name: Not on file    Number of children: Not on file    Years of education: Not on file    Highest education level: Not on file   Occupational History    Not on file   Tobacco Use    Smoking status: Never Smoker    Smokeless tobacco: Never Used   Substance and Sexual Activity    Alcohol use: Not on file    Drug use: Not on file    Sexual activity: Not on file   Other Topics Concern    Not on file   Social History Narrative    Not on file     Social Determinants of Health     Financial Resource Strain:     Difficulty of Paying Living Expenses: Not on file   Food Insecurity:     Worried About Running Out of Food in the Last Year: Not on file    Luci of Food in the Last Year: Not on file   Transportation Needs:     Lack of Transportation (Medical): Not on file    Lack of Transportation (Non-Medical):  Not on file   Physical Activity:     Days of Exercise per Week: Not on file    Minutes of Exercise per Session: Not on file   Stress:     Feeling of Stress : Not on file   Social Connections:     Frequency of Communication with Friends and Family: Not on file    Frequency of Social Gatherings with Friends and Family: Not on file    Attends Yazidi Services: Not on file    Active Member of [address] [place] [address] or Organizations: Not on file    Attends Club or Organization Meetings: Not on file    Marital Status: Not on file   Intimate Partner Violence:     Fear of Current or Ex-Partner: Not on file    Emotionally Abused: Not on file    Physically Abused: Not on file    Sexually Abused: Not on file   Housing Stability:     Unable to Pay for Housing in the Last Year: Not on file    Number of Rigoberto in the Last Year: Not on file    Unstable Housing in the Last Year: Not on file       MEDICATIONS:    Current Facility-Administered Medications:     acetaminophen (TYLENOL) tablet 650 mg, 650 mg, Oral, Q4H PRN, Rigoberto Yarbrough MD    aluminum & magnesium hydroxide-simethicone (MAALOX) 200-200-20 MG/5ML suspension 30 mL, 30 mL, Oral, Q6H PRN, Rigoberto Yarbrough MD, 30 mL at 02/21/22 0539    hydrOXYzine (ATARAX) tablet 50 mg, 50 mg, Oral, TID PRN, Rigoberto Yarbrough MD    ibuprofen (ADVIL;MOTRIN) tablet 400 mg, 400 mg, Oral, Q6H PRN, Rigoberto Yarbrough MD    traZODone (DESYREL) tablet 50 mg, 50 mg, Oral, Nightly PRN, Rigoberto Yarbrough MD    polyethylene glycol (GLYCOLAX) packet 17 g, 17 g, Oral, Daily PRN, Rigoberto Yarbrough MD    nicotine polacrilex (NICORETTE) gum 2 mg, 2 mg, Oral, PRN, Rigoberto Yarbrough MD, 2 mg at 02/20/22 6355    Examination:  /69   Pulse 86   Temp 97.6 °F (36.4 °C) (Temporal)   Resp 16   Ht 5' 6\" (1.676 m)   Wt 180 lb (81.6 kg)   LMP 02/20/2022   BMI 29.05 kg/m²   Gait - steady    HOSPITAL COURSE[de-identified]  Following admission to the hospital, patient had a complete physical exam and blood work up. The patient was referred to Internal Medicine.    Patient was monitored closely with suicide precaution  Patient was started on no psychotropic medications because he denied all symptoms. He also had a prolonged QTC and he felt the patient was not in favor of antipresent trial  Was encouraged to participate in group and other milieu activity  Patient started to feel better with this combination of treatment. Significant progress in the symptoms since admission. Mood is improved  The patient denies AVH or paranoid thoughts  The patient denies any hopelessness or worthlessness  No active SI/HI  Appetite:  [x] Normal  [] Increased  [] Decreased    Sleep:       [x] Normal  [] Fair       [] Poor            Energy:    [x] Normal  [] Increased  [] Decreased     SI [] Present  [x] Absent  HI  []Present  [x] Absent   Aggression:  [] yes  [] no  Patient is [x] able  [] unable to CONTRACT FOR SAFETY   Medication side effects(SE):  [x] None(Psych. Meds.) [] Other      Mental Status Examination on discharge:    Level of consciousness:  within normal limits   Appearance:  well-appearing  Behavior/Motor:  no abnormalities noted  Attitude toward examiner:  attentive and good eye contact  Speech:  spontaneous, normal rate and normal volume   Mood: euthymic  Affect:  mood congruent  Thought processes:  linear, goal directed and coherent   Thought content:  Suicidal Ideation:  denies suicidal ideation  Delusions:  no evidence of delusions  Perceptual Disturbance:  denies any perceptual disturbance  Cognition:  oriented to person, place, and time   Concentration intact  Memory intact  Insight good   Judgement fair   Fund of Knowledge adequate      ASSESSMENT:  Patient symptoms are:  [x] Well controlled  [x] Improving  [] Worsening  [] No change      Diagnosis:  Principal Problem:    Major depressive disorder, single episode, severe without psychosis ([facility]  Active Problems:    Depression with suicidal ideation  Resolved Problems:    * No resolved hospital problems.  *      LABS:    No results for input(s): WBC, HGB, PLT in the last 72 hours. No results for input(s): NA, K, CL, CO2, BUN, CREATININE, GLUCOSE in the last 72 hours. No results for input(s): BILITOT, ALKPHOS, AST, ALT in the last 72 hours. Lab Results   Component Value Date    BARBSCNU NEGATIVE 02/15/2022    LABBENZ NEGATIVE 02/15/2022    LABMETH NEGATIVE 02/15/2022    PPXUR NOT REPORTED 02/15/2022     Lab Results   Component Value Date    TSH 1.61 10/29/2013     No results found for: LITHIUM  No results found for: VALPROATE, CBMZ    RISK ASSESSMENT AT DISCHARGE: Low risk for suicide and homicide. Treatment Plan:  Reviewed current Medications with the patient. Education provided on the complaince with treatment. Risks, benefits, side effects, drug-to-drug interactions and alternatives to treatment were discussed. Encourage patient to attend outpatient follow up appointment and therapy. Patient was advised to call the outpatient provider, visit the nearest ED or call 911 if symptoms are not manageable. Medication List      STOP taking these medications    aspirin 81 MG chewable tablet                  Core Measures statement:   Not applicable      TIME SPENT - 35 MINUTES TO COMPLETE THE EVALUATION, DISCHARGE SUMMARY, MEDICATION RECONCILIATION AND FOLLOW UP CARE                                         Jayna Wilburn is a 19 y.o. female being evaluated Erasmo Collins MD on 2/23/2022 at 10:44 AM    An electronic signature was used to authenticate this note. **This report has been created using voice recognition software. It may contain minor errors which are inherent in voice recognition technology. **

## 2022-02-23 NOTE — PROGRESS NOTES
Daily Progress Note  2/22/2022    Patient Name: Stew Coppola    CHIEF COMPLAINT: Status post overdose         SUBJECTIVE:      Patient is seen today for a follow up assessment. She is interviewed today bedside, she is initially difficult to arouse though eventually does sit up in bed and engage in interview. She is extremely discharge focused. She continues to minimize the events that led to hospitalization, she reports a lack of insight because she is unable to recall any of the events leading up to her medical emergency. She continues to deny that she swallowed any type of cocaine. She cites the negative urine drug screen as proof of this. Social work reports that she endorsed concern of being evicted however when we discussed this during our conversation she denied that concern. She reported to this [place] that she was supposed to have court, however she did not disclose that information to social work. She remains very superficial and is avoidant of engaging in any meaningful conversation. She continues to deny any suicidal ideation or symptoms of depression. She does endorse anxiety with regards to her hospitalization and anticipates discharge.     Appetite:  [x] Adequate/Unchanged  [] Increased  [] Decreased      Sleep:       [x] Adequate/Unchanged  [] Fair  [] Poor      Group Attendance on Unit:   [] Yes   [x] Selectively    [] No    Compliant with scheduled medications: [] Yes  [x] No, psychotropic medications held secondary to prolonged QTC per attending    Received emergency medications in past 24 hrs: [] Yes   [x] No    Medication Side Effects: No scheduled medications         Mental Status Exam  Level of consciousness: Alert and awake   Appearance: Appropriate attire for setting, resting in bed, with fair  grooming and hygiene   Behavior/Motor: Approachable, initially resistant to engage in interview though eventually more responsive  Attitude toward examiner: Superficial, attentive, good eye contact  Speech: Normal rate, volume and tone  Mood: \"Fine\"  Affect: Anxious, almost nervous  Thought processes: Linear and coherent  Thought content: Remains discharge focused, Denies homicidal ideation  Suicidal Ideation: Denies suicidal ideations, contracts for safety on the unit. Delusions: No evidence of delusions. Perceptual Disturbance: Denies, patient does not appear to be responding to internal stimuli. Cognition: Oriented to self, location, time, and situation  Memory: Impaired, no recall of the events leading up to hospitalization  Insight: poor   Judgement: poor       Data   height is 5' 6\" (1.676 m) and weight is 180 lb (81.6 kg). Her temporal temperature is 97.8 °F (36.6 °C). Her blood pressure is 111/69 and her pulse is 96. Her respiration is 14. Labs:   No visits with results within 2 Day(s) from this visit. Latest known visit with results is:   No results displayed because visit has over 200 results. Reviewed patient's current plan of care and vital signs with nursing staff. Labs reviewed: [x] Yes    Medications  Current Facility-Administered Medications: acetaminophen (TYLENOL) tablet 650 mg, 650 mg, Oral, Q4H PRN  aluminum & magnesium hydroxide-simethicone (MAALOX) 200-200-20 MG/5ML suspension 30 mL, 30 mL, Oral, Q6H PRN  hydrOXYzine (ATARAX) tablet 50 mg, 50 mg, Oral, TID PRN  ibuprofen (ADVIL;MOTRIN) tablet 400 mg, 400 mg, Oral, Q6H PRN  traZODone (DESYREL) tablet 50 mg, 50 mg, Oral, Nightly PRN  polyethylene glycol (GLYCOLAX) packet 17 g, 17 g, Oral, Daily PRN  nicotine polacrilex (NICORETTE) gum 2 mg, 2 mg, Oral, PRN    ASSESSMENT  Major depressive disorder, single episode, severe without psychosis ([facility] [address]         PLAN  Patient symptoms are: Improving  No scheduled medications at this time  Monitor need and frequency of PRN medications. Encourage participation in groups and milieu. Attempt to develop insight. Psycho-education conducted.   Supportive Therapy conducted. Probable discharge is likely in the day, per attending physician  Follow-up daily while inpatient. Patient continues to be monitored in the inpatient psychiatric facility at [facility] for safety and stabilization. Patient continues to need, on a daily basis, active treatment furnished directly by or requiring the supervision of inpatient psychiatric personnel. Electronically signed by GHADA Mauro CNP on 2/22/2022 at 8:54 PM    **This report has been created using voice recognition software. It may contain minor errors which are inherent in voice recognition technology. **

## 2022-02-23 NOTE — BH NOTE
Patient given tobacco quitline number 59446553393 at this time, refusing to call at this time, states \" I just dont want to quit now\"- patient given information as to the dangers of long term tobacco use. Continue to reinforce the importance of tobacco cessation.

## 2022-02-23 NOTE — DISCHARGE SUMMARY
[facility]     Department of Internal Medicine - Staff Internal Medicine Teaching Service    INPATIENT DISCHARGE SUMMARY      Patient Identification:  Rylee Vyas is a 19 y.o. female. :  2000  MRN: [id number]     Acct: [de-identified]   PCP: No primary care provider on file. Admit Date:  2/15/2022  Discharge date and time: 2022 12:05 AM   Attending Provider: Dr. Fartun Reynolds Problem Lists:  Principal Problem:    Cardiac arrest [facility])  Active Problems:    Depression    PRATIMA (acute kidney injury) ([facility] [address]    Elevated troponin    QT prolongation    Non-ST elevation MI (NSTEMI) ([facility]  Resolved Problems:    * No resolved hospital problems. *      HOSPITAL STAY     Brief Inpatient course:   Rylee Vyas is a 19 y.o. female who was admitted for the management of Cardiac arrest [facility]), after swallowing crack cocaine and was brought from a correctional facility. Patient does not remember any of the events at the correctional facility and she only remembers waking up in the hospital. Per EMR, patient became altered and collapsed at the longterm and was also observed to have tonic-clonic movements on a security camera footage. After the seizure, patient had PEA arrest that required CPR and several rounds of epinephrine for about 20 minutes after which ROSC was achieved. In the ER, patient presented intubated, sedated and hypotensive on presentation. She was being initiated on therapeutic hypothermia however she followed verbal commands and hypothermia was discontinued. She was requiring vasopressor support and was sedated on fentanyl and Versed infusions. Cardiology was consulted. Patient also had PRATIMA and leukocytosis. She was also cocaine positive. In the ICU, patient remained intubated and sedated on low vent setting. Cardiology recommended elevated troponin likely type II NSTEMI and was started on heparin drip. Echo showed LVEF 60% and no significant valvular regurgitation or stenosis. Patient was extubated on 2/17/22 to nasal canula 2L. She tolerated extubation well. Psychiatry was consulted and recommended patient with moderate risk of suicide and recommended admission to psychiatry once medically clear. Patient was transferred to medicine floor on 2/18 and she was doing well, on room air. Psychiatry was consulted and moderate risk of suicide, recommended admission to psychiatry once medically clear. Patient is medically cleared for discharge to [facility]. Procedures/ Significant Interventions:    Intubation and mechanical ventilation- 2/15/2022  Extubation- 2/17/2022    Consults:     Consults:     Final Specialist Recommendations/Findings:   IP CONSULT TO CARDIOLOGY  IP CONSULT TO PSYCHIATRY  IP CONSULT TO CASE MANAGEMENT      Any Hospital Acquired Infections: none    Discharge Functional Status:  stable    DISCHARGE PLAN     Disposition: [facility]    Patient Instructions:   Discharge Medication List as of 2/20/2022 12:07 AM        START taking these medications    Details   aspirin 81 MG chewable tablet Take 1 tablet by mouth daily, Disp-30 tablet, R-3Normal             Activity: activity as tolerated    Diet: regular diet    Follow-up:    Sara Funes MD  [address] [place] [address]  [phone]    Schedule an appointment as soon as possible for a visit in 1 week  for post-hospitalization follow up    Jimmie Garciaure, [address] [address]  [phone]    Schedule an appointment as soon as possible for a visit in 1 week  for post cardiac arrest      Patient Instructions:   Admission to [facility] for further management of suicide risk. Please follow up with PCP for post-hospitalization follow up.   Please follow up with Cardiology for post cardiac arrest.     Follow up labs: None    Follow up imaging: None      Bettina Bazan MD,   Internal Medicine Resident, PGY-1  [address] [facility]; [place], [place]  2/22/2022, 10:52 PM    Attending Physician Statement  I have discussed the care of Tatiana Mitchell and I have examined the patient myselft and taken ros and hpi , including pertinent history and exam findings,  with the resident. I have reviewed the key elements of all parts of the encounter with the resident. I agree with the assessment, plan and orders as documented by the resident. I spent approx 35 mins in direct patient care as above and discussing discharge with patient, reviewing medications and counseling for discharge .     Electronically signed by Kath Jj MD

## 2022-02-25 NOTE — CARE COORDINATION
Name: Simone     : 2000    Discharge Date: 22    Primary Auth/Cert #: [id number]    Destination: home     Discharge Medications:      Medication List      STOP taking these medications    aspirin 81 MG chewable tablet            Follow Up Appointment: Discharge to home address:  [address], [place]  Go on 2022  If Александр Bryan does not have a ride home, please advise  to call for a complimentary cab service    [facility]  [address], [address], [address]  Phone: [phone]  Fax: [phone]    Go on 3/3/2022  You have an appointment on Thursday, March 3 at 8:30 am with Alicia.

## 2022-03-20 PROBLEM — R77.8 ELEVATED TROPONIN: Status: RESOLVED | Noted: 2022-02-18 | Resolved: 2022-03-20

## 2022-05-25 NOTE — PROGRESS NOTES
CLINICAL PHARMACY NOTE: MEDS TO BEDS    Total # of Prescriptions Filled: 1   The following medications were delivered to the patient:  · aspirin    Additional Documentation: Drysol Pregnancy And Lactation Text: This medication is considered safe during pregnancy and breast feeding.